# Patient Record
Sex: FEMALE | URBAN - METROPOLITAN AREA
[De-identification: names, ages, dates, MRNs, and addresses within clinical notes are randomized per-mention and may not be internally consistent; named-entity substitution may affect disease eponyms.]

---

## 2020-03-24 ENCOUNTER — INPATIENT (INPATIENT)
Facility: HOSPITAL | Age: 85
LOS: 14 days | Discharge: NURSING, REHAB OR LONG-TERM CARE | DRG: 177 | End: 2020-04-08
Attending: HOSPITALIST | Admitting: HOSPITALIST
Payer: MEDICARE

## 2020-03-24 VITALS
RESPIRATION RATE: 18 BRPM | OXYGEN SATURATION: 97 % | SYSTOLIC BLOOD PRESSURE: 145 MMHG | TEMPERATURE: 99 F | DIASTOLIC BLOOD PRESSURE: 78 MMHG | HEART RATE: 101 BPM | HEIGHT: 62 IN | WEIGHT: 145.06 LBS

## 2020-03-24 LAB
ALBUMIN SERPL ELPH-MCNC: 2.9 G/DL — LOW (ref 3.5–5)
ALP SERPL-CCNC: 90 U/L — SIGNIFICANT CHANGE UP (ref 40–120)
ALT FLD-CCNC: 30 U/L DA — SIGNIFICANT CHANGE UP (ref 10–60)
ANION GAP SERPL CALC-SCNC: 8 MMOL/L — SIGNIFICANT CHANGE UP (ref 5–17)
AST SERPL-CCNC: 51 U/L — HIGH (ref 10–40)
BASE EXCESS BLDA CALC-SCNC: 1.9 MMOL/L — SIGNIFICANT CHANGE UP (ref -2–2)
BASOPHILS # BLD AUTO: 0.01 K/UL — SIGNIFICANT CHANGE UP (ref 0–0.2)
BASOPHILS NFR BLD AUTO: 0.1 % — SIGNIFICANT CHANGE UP (ref 0–2)
BILIRUB SERPL-MCNC: 0.6 MG/DL — SIGNIFICANT CHANGE UP (ref 0.2–1.2)
BLOOD GAS COMMENTS ARTERIAL: SIGNIFICANT CHANGE UP
BUN SERPL-MCNC: 29 MG/DL — HIGH (ref 7–18)
CALCIUM SERPL-MCNC: 8.9 MG/DL — SIGNIFICANT CHANGE UP (ref 8.4–10.5)
CHLORIDE SERPL-SCNC: 104 MMOL/L — SIGNIFICANT CHANGE UP (ref 96–108)
CO2 SERPL-SCNC: 27 MMOL/L — SIGNIFICANT CHANGE UP (ref 22–31)
CREAT SERPL-MCNC: 1.62 MG/DL — HIGH (ref 0.5–1.3)
EOSINOPHIL # BLD AUTO: 0 K/UL — SIGNIFICANT CHANGE UP (ref 0–0.5)
EOSINOPHIL NFR BLD AUTO: 0 % — SIGNIFICANT CHANGE UP (ref 0–6)
GLUCOSE SERPL-MCNC: 125 MG/DL — HIGH (ref 70–99)
HCO3 BLDA-SCNC: 24 MMOL/L — SIGNIFICANT CHANGE UP (ref 23–27)
HCT VFR BLD CALC: 37.1 % — SIGNIFICANT CHANGE UP (ref 34.5–45)
HGB BLD-MCNC: 11.9 G/DL — SIGNIFICANT CHANGE UP (ref 11.5–15.5)
HOROWITZ INDEX BLDA+IHG-RTO: 100 — SIGNIFICANT CHANGE UP
IMM GRANULOCYTES NFR BLD AUTO: 0.7 % — SIGNIFICANT CHANGE UP (ref 0–1.5)
LACTATE SERPL-SCNC: 2 MMOL/L — SIGNIFICANT CHANGE UP (ref 0.7–2)
LYMPHOCYTES # BLD AUTO: 1.14 K/UL — SIGNIFICANT CHANGE UP (ref 1–3.3)
LYMPHOCYTES # BLD AUTO: 8.5 % — LOW (ref 13–44)
MCHC RBC-ENTMCNC: 31.6 PG — SIGNIFICANT CHANGE UP (ref 27–34)
MCHC RBC-ENTMCNC: 32.1 GM/DL — SIGNIFICANT CHANGE UP (ref 32–36)
MCV RBC AUTO: 98.7 FL — SIGNIFICANT CHANGE UP (ref 80–100)
MONOCYTES # BLD AUTO: 0.45 K/UL — SIGNIFICANT CHANGE UP (ref 0–0.9)
MONOCYTES NFR BLD AUTO: 3.4 % — SIGNIFICANT CHANGE UP (ref 2–14)
NEUTROPHILS # BLD AUTO: 11.67 K/UL — HIGH (ref 1.8–7.4)
NEUTROPHILS NFR BLD AUTO: 87.3 % — HIGH (ref 43–77)
NRBC # BLD: 0 /100 WBCS — SIGNIFICANT CHANGE UP (ref 0–0)
NT-PROBNP SERPL-SCNC: 5759 PG/ML — HIGH (ref 0–450)
PCO2 BLDA: 32 MMHG — SIGNIFICANT CHANGE UP (ref 32–46)
PH BLDA: 7.49 — HIGH (ref 7.35–7.45)
PLATELET # BLD AUTO: 169 K/UL — SIGNIFICANT CHANGE UP (ref 150–400)
PO2 BLDA: 68 MMHG — LOW (ref 74–108)
POTASSIUM SERPL-MCNC: 3.9 MMOL/L — SIGNIFICANT CHANGE UP (ref 3.5–5.3)
POTASSIUM SERPL-SCNC: 3.9 MMOL/L — SIGNIFICANT CHANGE UP (ref 3.5–5.3)
PROT SERPL-MCNC: 8.7 G/DL — HIGH (ref 6–8.3)
RBC # BLD: 3.76 M/UL — LOW (ref 3.8–5.2)
RBC # FLD: 12.7 % — SIGNIFICANT CHANGE UP (ref 10.3–14.5)
SAO2 % BLDA: 94 % — SIGNIFICANT CHANGE UP (ref 92–96)
SODIUM SERPL-SCNC: 139 MMOL/L — SIGNIFICANT CHANGE UP (ref 135–145)
TROPONIN I SERPL-MCNC: 0.04 NG/ML — SIGNIFICANT CHANGE UP (ref 0–0.04)
WBC # BLD: 13.36 K/UL — HIGH (ref 3.8–10.5)
WBC # FLD AUTO: 13.36 K/UL — HIGH (ref 3.8–10.5)

## 2020-03-24 PROCEDURE — 71045 X-RAY EXAM CHEST 1 VIEW: CPT | Mod: 26

## 2020-03-24 RX ORDER — PIPERACILLIN AND TAZOBACTAM 4; .5 G/20ML; G/20ML
3.38 INJECTION, POWDER, LYOPHILIZED, FOR SOLUTION INTRAVENOUS ONCE
Refills: 0 | Status: COMPLETED | OUTPATIENT
Start: 2020-03-24 | End: 2020-03-24

## 2020-03-24 RX ADMIN — PIPERACILLIN AND TAZOBACTAM 200 GRAM(S): 4; .5 INJECTION, POWDER, LYOPHILIZED, FOR SOLUTION INTRAVENOUS at 19:17

## 2020-03-24 NOTE — ED ADULT NURSE NOTE - NSIMPLEMENTINTERV_GEN_ALL_ED
Implemented All Fall Risk Interventions:  [place] to call system. Call bell, personal items and telephone within reach. Instruct patient to call for assistance. Room bathroom lighting operational. Non-slip footwear when patient is off stretcher. Physically safe environment: no spills, clutter or unnecessary equipment. Stretcher in lowest position, wheels locked, appropriate side rails in place. Provide visual cue, wrist band, yellow gown, etc. Monitor gait and stability. Monitor for mental status changes and reorient to person, place, and time. Review medications for side effects contributing to fall risk. Reinforce activity limits and safety measures with patient and family.

## 2020-03-25 DIAGNOSIS — E11.9 TYPE 2 DIABETES MELLITUS WITHOUT COMPLICATIONS: ICD-10-CM

## 2020-03-25 DIAGNOSIS — J96.01 ACUTE RESPIRATORY FAILURE WITH HYPOXIA: ICD-10-CM

## 2020-03-25 DIAGNOSIS — Z29.9 ENCOUNTER FOR PROPHYLACTIC MEASURES, UNSPECIFIED: ICD-10-CM

## 2020-03-25 DIAGNOSIS — J18.9 PNEUMONIA, UNSPECIFIED ORGANISM: ICD-10-CM

## 2020-03-25 DIAGNOSIS — F03.90 UNSPECIFIED DEMENTIA WITHOUT BEHAVIORAL DISTURBANCE: ICD-10-CM

## 2020-03-25 DIAGNOSIS — I10 ESSENTIAL (PRIMARY) HYPERTENSION: ICD-10-CM

## 2020-03-25 DIAGNOSIS — N17.9 ACUTE KIDNEY FAILURE, UNSPECIFIED: ICD-10-CM

## 2020-03-25 LAB
ALBUMIN SERPL ELPH-MCNC: 2.3 G/DL — LOW (ref 3.5–5)
ALP SERPL-CCNC: 72 U/L — SIGNIFICANT CHANGE UP (ref 40–120)
ALT FLD-CCNC: 27 U/L DA — SIGNIFICANT CHANGE UP (ref 10–60)
ANION GAP SERPL CALC-SCNC: 6 MMOL/L — SIGNIFICANT CHANGE UP (ref 5–17)
AST SERPL-CCNC: 50 U/L — HIGH (ref 10–40)
BASOPHILS # BLD AUTO: 0.01 K/UL — SIGNIFICANT CHANGE UP (ref 0–0.2)
BASOPHILS NFR BLD AUTO: 0.1 % — SIGNIFICANT CHANGE UP (ref 0–2)
BILIRUB SERPL-MCNC: 0.5 MG/DL — SIGNIFICANT CHANGE UP (ref 0.2–1.2)
BUN SERPL-MCNC: 30 MG/DL — HIGH (ref 7–18)
CALCIUM SERPL-MCNC: 8.3 MG/DL — LOW (ref 8.4–10.5)
CHLORIDE SERPL-SCNC: 105 MMOL/L — SIGNIFICANT CHANGE UP (ref 96–108)
CHOLEST SERPL-MCNC: 92 MG/DL — SIGNIFICANT CHANGE UP (ref 10–199)
CO2 SERPL-SCNC: 27 MMOL/L — SIGNIFICANT CHANGE UP (ref 22–31)
CREAT SERPL-MCNC: 1.51 MG/DL — HIGH (ref 0.5–1.3)
EOSINOPHIL # BLD AUTO: 0 K/UL — SIGNIFICANT CHANGE UP (ref 0–0.5)
EOSINOPHIL NFR BLD AUTO: 0 % — SIGNIFICANT CHANGE UP (ref 0–6)
FLU A RESULT: SIGNIFICANT CHANGE UP
FLU A RESULT: SIGNIFICANT CHANGE UP
FLUAV AG NPH QL: SIGNIFICANT CHANGE UP
FLUBV AG NPH QL: SIGNIFICANT CHANGE UP
FOLATE SERPL-MCNC: >20 NG/ML — SIGNIFICANT CHANGE UP
GLUCOSE SERPL-MCNC: 112 MG/DL — HIGH (ref 70–99)
HBA1C BLD-MCNC: 6.6 % — HIGH (ref 4–5.6)
HCT VFR BLD CALC: 32.2 % — LOW (ref 34.5–45)
HDLC SERPL-MCNC: 39 MG/DL — LOW
HGB BLD-MCNC: 10.5 G/DL — LOW (ref 11.5–15.5)
IMM GRANULOCYTES NFR BLD AUTO: 0.6 % — SIGNIFICANT CHANGE UP (ref 0–1.5)
LIPID PNL WITH DIRECT LDL SERPL: 39 MG/DL — SIGNIFICANT CHANGE UP
LYMPHOCYTES # BLD AUTO: 0.74 K/UL — LOW (ref 1–3.3)
LYMPHOCYTES # BLD AUTO: 7.8 % — LOW (ref 13–44)
MAGNESIUM SERPL-MCNC: 2.2 MG/DL — SIGNIFICANT CHANGE UP (ref 1.6–2.6)
MCHC RBC-ENTMCNC: 31.8 PG — SIGNIFICANT CHANGE UP (ref 27–34)
MCHC RBC-ENTMCNC: 32.6 GM/DL — SIGNIFICANT CHANGE UP (ref 32–36)
MCV RBC AUTO: 97.6 FL — SIGNIFICANT CHANGE UP (ref 80–100)
MONOCYTES # BLD AUTO: 0.34 K/UL — SIGNIFICANT CHANGE UP (ref 0–0.9)
MONOCYTES NFR BLD AUTO: 3.6 % — SIGNIFICANT CHANGE UP (ref 2–14)
NEUTROPHILS # BLD AUTO: 8.38 K/UL — HIGH (ref 1.8–7.4)
NEUTROPHILS NFR BLD AUTO: 87.9 % — HIGH (ref 43–77)
NRBC # BLD: 0 /100 WBCS — SIGNIFICANT CHANGE UP (ref 0–0)
PHOSPHATE SERPL-MCNC: 2.3 MG/DL — LOW (ref 2.5–4.5)
PLATELET # BLD AUTO: 147 K/UL — LOW (ref 150–400)
POTASSIUM SERPL-MCNC: 3.7 MMOL/L — SIGNIFICANT CHANGE UP (ref 3.5–5.3)
POTASSIUM SERPL-SCNC: 3.7 MMOL/L — SIGNIFICANT CHANGE UP (ref 3.5–5.3)
PROT SERPL-MCNC: 7.3 G/DL — SIGNIFICANT CHANGE UP (ref 6–8.3)
RBC # BLD: 3.3 M/UL — LOW (ref 3.8–5.2)
RBC # FLD: 12.5 % — SIGNIFICANT CHANGE UP (ref 10.3–14.5)
RSV RESULT: SIGNIFICANT CHANGE UP
RSV RNA RESP QL NAA+PROBE: SIGNIFICANT CHANGE UP
SARS-COV-2 RNA SPEC QL NAA+PROBE: DETECTED
SODIUM SERPL-SCNC: 138 MMOL/L — SIGNIFICANT CHANGE UP (ref 135–145)
TOTAL CHOLESTEROL/HDL RATIO MEASUREMENT: 2.4 RATIO — LOW (ref 3.3–7.1)
TRIGL SERPL-MCNC: 69 MG/DL — SIGNIFICANT CHANGE UP (ref 10–149)
TSH SERPL-MCNC: 0.66 UU/ML — SIGNIFICANT CHANGE UP (ref 0.34–4.82)
VIT B12 SERPL-MCNC: 1196 PG/ML — SIGNIFICANT CHANGE UP (ref 232–1245)
WBC # BLD: 9.53 K/UL — SIGNIFICANT CHANGE UP (ref 3.8–10.5)
WBC # FLD AUTO: 9.53 K/UL — SIGNIFICANT CHANGE UP (ref 3.8–10.5)

## 2020-03-25 PROCEDURE — 99285 EMERGENCY DEPT VISIT HI MDM: CPT | Mod: CS

## 2020-03-25 PROCEDURE — 99223 1ST HOSP IP/OBS HIGH 75: CPT

## 2020-03-25 RX ORDER — SODIUM CHLORIDE 9 MG/ML
1000 INJECTION INTRAMUSCULAR; INTRAVENOUS; SUBCUTANEOUS
Refills: 0 | Status: DISCONTINUED | OUTPATIENT
Start: 2020-03-25 | End: 2020-03-31

## 2020-03-25 RX ORDER — PANTOPRAZOLE SODIUM 20 MG/1
40 TABLET, DELAYED RELEASE ORAL
Refills: 0 | Status: DISCONTINUED | OUTPATIENT
Start: 2020-03-25 | End: 2020-04-08

## 2020-03-25 RX ORDER — SODIUM,POTASSIUM PHOSPHATES 278-250MG
1 POWDER IN PACKET (EA) ORAL ONCE
Refills: 0 | Status: COMPLETED | OUTPATIENT
Start: 2020-03-25 | End: 2020-03-25

## 2020-03-25 RX ORDER — MEMANTINE HYDROCHLORIDE 10 MG/1
1 TABLET ORAL
Qty: 0 | Refills: 0 | DISCHARGE

## 2020-03-25 RX ORDER — AMLODIPINE BESYLATE 2.5 MG/1
10 TABLET ORAL DAILY
Refills: 0 | Status: DISCONTINUED | OUTPATIENT
Start: 2020-03-25 | End: 2020-04-08

## 2020-03-25 RX ORDER — ENOXAPARIN SODIUM 100 MG/ML
40 INJECTION SUBCUTANEOUS DAILY
Refills: 0 | Status: DISCONTINUED | OUTPATIENT
Start: 2020-03-25 | End: 2020-04-08

## 2020-03-25 RX ORDER — ACETAMINOPHEN 500 MG
650 TABLET ORAL EVERY 6 HOURS
Refills: 0 | Status: DISCONTINUED | OUTPATIENT
Start: 2020-03-25 | End: 2020-04-08

## 2020-03-25 RX ORDER — FUROSEMIDE 40 MG
20 TABLET ORAL DAILY
Refills: 0 | Status: DISCONTINUED | OUTPATIENT
Start: 2020-03-25 | End: 2020-03-25

## 2020-03-25 RX ORDER — MEMANTINE HYDROCHLORIDE 10 MG/1
10 TABLET ORAL
Refills: 0 | Status: DISCONTINUED | OUTPATIENT
Start: 2020-03-25 | End: 2020-04-08

## 2020-03-25 RX ORDER — HYDRALAZINE HCL 50 MG
1 TABLET ORAL
Qty: 0 | Refills: 0 | DISCHARGE

## 2020-03-25 RX ORDER — HYDRALAZINE HCL 50 MG
25 TABLET ORAL EVERY 8 HOURS
Refills: 0 | Status: DISCONTINUED | OUTPATIENT
Start: 2020-03-25 | End: 2020-04-08

## 2020-03-25 RX ORDER — PANTOPRAZOLE SODIUM 20 MG/1
1 TABLET, DELAYED RELEASE ORAL
Qty: 0 | Refills: 0 | DISCHARGE

## 2020-03-25 RX ORDER — AMLODIPINE BESYLATE 2.5 MG/1
1 TABLET ORAL
Qty: 0 | Refills: 0 | DISCHARGE

## 2020-03-25 RX ORDER — TOLTERODINE TARTRATE 1 MG/1
1 TABLET, FILM COATED ORAL
Qty: 0 | Refills: 0 | DISCHARGE

## 2020-03-25 RX ORDER — LOSARTAN POTASSIUM 100 MG/1
100 TABLET, FILM COATED ORAL DAILY
Refills: 0 | Status: DISCONTINUED | OUTPATIENT
Start: 2020-03-25 | End: 2020-03-31

## 2020-03-25 RX ORDER — METOPROLOL TARTRATE 50 MG
100 TABLET ORAL DAILY
Refills: 0 | Status: DISCONTINUED | OUTPATIENT
Start: 2020-03-25 | End: 2020-04-08

## 2020-03-25 RX ORDER — LOSARTAN POTASSIUM 100 MG/1
1 TABLET, FILM COATED ORAL
Qty: 0 | Refills: 0 | DISCHARGE

## 2020-03-25 RX ORDER — OXYBUTYNIN CHLORIDE 5 MG
10 TABLET ORAL
Refills: 0 | Status: DISCONTINUED | OUTPATIENT
Start: 2020-03-25 | End: 2020-04-08

## 2020-03-25 RX ORDER — SITAGLIPTIN 50 MG/1
1 TABLET, FILM COATED ORAL
Qty: 0 | Refills: 0 | DISCHARGE

## 2020-03-25 RX ORDER — METOPROLOL TARTRATE 50 MG
1 TABLET ORAL
Qty: 0 | Refills: 0 | DISCHARGE

## 2020-03-25 RX ORDER — ATORVASTATIN CALCIUM 80 MG/1
40 TABLET, FILM COATED ORAL AT BEDTIME
Refills: 0 | Status: DISCONTINUED | OUTPATIENT
Start: 2020-03-25 | End: 2020-04-08

## 2020-03-25 RX ADMIN — Medication 25 MILLIGRAM(S): at 22:57

## 2020-03-25 RX ADMIN — Medication 10 MILLIGRAM(S): at 06:00

## 2020-03-25 RX ADMIN — MEMANTINE HYDROCHLORIDE 10 MILLIGRAM(S): 10 TABLET ORAL at 06:00

## 2020-03-25 RX ADMIN — AMLODIPINE BESYLATE 10 MILLIGRAM(S): 2.5 TABLET ORAL at 06:09

## 2020-03-25 RX ADMIN — MEMANTINE HYDROCHLORIDE 10 MILLIGRAM(S): 10 TABLET ORAL at 17:56

## 2020-03-25 RX ADMIN — ATORVASTATIN CALCIUM 40 MILLIGRAM(S): 80 TABLET, FILM COATED ORAL at 22:56

## 2020-03-25 RX ADMIN — Medication 25 MILLIGRAM(S): at 13:08

## 2020-03-25 RX ADMIN — Medication 25 MILLIGRAM(S): at 06:08

## 2020-03-25 RX ADMIN — PANTOPRAZOLE SODIUM 40 MILLIGRAM(S): 20 TABLET, DELAYED RELEASE ORAL at 06:00

## 2020-03-25 RX ADMIN — Medication 100 MILLIGRAM(S): at 06:00

## 2020-03-25 RX ADMIN — SODIUM CHLORIDE 60 MILLILITER(S): 9 INJECTION INTRAMUSCULAR; INTRAVENOUS; SUBCUTANEOUS at 04:35

## 2020-03-25 RX ADMIN — SODIUM CHLORIDE 60 MILLILITER(S): 9 INJECTION INTRAMUSCULAR; INTRAVENOUS; SUBCUTANEOUS at 06:01

## 2020-03-25 RX ADMIN — Medication 10 MILLIGRAM(S): at 17:56

## 2020-03-25 RX ADMIN — ENOXAPARIN SODIUM 40 MILLIGRAM(S): 100 INJECTION SUBCUTANEOUS at 12:11

## 2020-03-25 RX ADMIN — LOSARTAN POTASSIUM 100 MILLIGRAM(S): 100 TABLET, FILM COATED ORAL at 06:09

## 2020-03-25 RX ADMIN — Medication 1 PACKET(S): at 12:11

## 2020-03-25 NOTE — H&P ADULT - HISTORY OF PRESENT ILLNESS
89 y/o female from [facility] with PMHx of HTN, DM, dementia (AAOx1-2) and HLD is sent to the ED for shortness of breath. Patient is a poor historian and denies any complaints. She denies cough or fever and reports feeling well at present. ABG confirms hypoxemia with pO2 68 on admission. Patient currently saturating at 97% on 4L NC. She is in no acute distress.    GOC: As per AL documents, patient is DNR. Will need to readdress this with family in AM to confirm if patient is both DNR/DNI.

## 2020-03-25 NOTE — H&P ADULT - NSICDXPASTMEDICALHX_GEN_ALL_CORE_FT
PAST MEDICAL HISTORY:  Colon cancer     CVA (cerebral vascular accident)     Dementia     DM (diabetes mellitus)     HTN (hypertension)

## 2020-03-25 NOTE — ED PROVIDER NOTE - MUSCULOSKELETAL, MLM
Spine appears normal, range of motion is not limited, no muscle or joint tenderness, RLE- sm ant ulcer, no surrounding erythema

## 2020-03-25 NOTE — H&P ADULT - PROBLEM SELECTOR PLAN 1
hypoxic on room air to low 90s, improved to 97% on 4L NC  CXR shows bilateral infiltrates  afebrile in ED  likely 2/2 to novel coronavirus  starting zithromax and rocephin for CAP coverage  f/u flu panel, RVP and COVID-19 PCR  f/u blood cx

## 2020-03-25 NOTE — GOALS OF CARE CONVERSATION - ADVANCED CARE PLANNING - CONVERSATION DETAILS
PC NP spoke with the patient and subsequently contacted the patient's son Amor Brasher.  Patient resides in [facility].  Goals of care were addressed.  Code status is DNR/DNI as patient's son stated he would not want any interventions that would prolong suffering.

## 2020-03-25 NOTE — H&P ADULT - ATTENDING COMMENTS
Pt seen and examined  Case discussed with housestaff.  Agree with hpi /ros/past hx/ med review as above    89 year old Nursing Home resident with med hx including HTN/DM 2/ dementia  who presents with persistent SOB per NH report. Pt unable to corroborate hx likely due to her cognitive deficits.  She is hypoxic requiring O2 supplementation. Afebrile in ED.    Vital Signs Last 24 Hrs  T(C): 37.5 (25 Mar 2020 05:03), Max: 37.5 (25 Mar 2020 05:03)  T(F): 99.5 (25 Mar 2020 05:03), Max: 99.5 (25 Mar 2020 05:03)  HR: 81 (25 Mar 2020 05:03) (81 - 101)  BP: 144/73 (25 Mar 2020 05:03) (144/73 - 160/60)  RR: 19 (25 Mar 2020 05:03) (18 - 19)  SpO2: 94% (25 Mar 2020 05:03) (94% - 97%)    Elderly woman, dyspneic with mild exertion on bed, alert and awake; able to answer simple questions.  CTA B/L RRR S1S2 only  Soft Nt ND BS +  No pedal edema  moving extremities spontaneously    labs                        11.9   13.36 )-----------( 169      ( 24 Mar 2020 19:19 )             37.1     03-24    139  |  104  |  29<H>  ----------------------------<  125<H>  3.9   |  27  |  1.62<H>    Ca    8.9      24 Mar 2020 19:19    TPro  8.7<H>  /  Alb  2.9<L>  /  TBili  0.6  /  DBili  x   /  AST  51<H>  /  ALT  30  /  AlkPhos  90  03-24    pro bnp - [id number]    ABG - ( 24 Mar 2020 19:12 )  pH, Arterial: 7.49  pH, Blood: x     /  pCO2: 32    /  pO2: 68    / HCO3: 24    / Base Excess: 1.9   /  SaO2: 94        Flu -ve    CXR -There is moderately extensive mixed interstitial and airspace opacity in the left lung with mild change on the right consistent with pneumonia or pneumonitis    Impression  - Acute respiratory failure with sever hypoxia  - Suspected COVID -19 with severe B/L pneumonia  - MARIA E vs CKD; no old labs to compare  - dementia  - DM2   - HTN  - elevated pro- bnp r/o LV dysfunction    A/P  Admit to medicine with isolation protocol  f/up tests pending; repeat trop  Empiric antibiotics - ceftriaxone and zithro  Ensure supplemental O2  Supportive care  IV hydration and repeat chem  If no improvement in renal indices - work up for CKD  ECHO  Glucose and BP control  Close monitoring  Pt is DNR

## 2020-03-25 NOTE — H&P ADULT - NSHPPHYSICALEXAM_GEN_ALL_CORE
Vital Signs Last 24 Hrs  T(C): 36.9 (25 Mar 2020 00:26), Max: 37.3 (24 Mar 2020 18:00)  T(F): 98.4 (25 Mar 2020 00:26), Max: 99.2 (24 Mar 2020 18:00)  HR: 85 (25 Mar 2020 00:26) (85 - 101)  BP: 160/60 (25 Mar 2020 00:26) (145/78 - 160/60)  BP(mean): --  RR: 18 (25 Mar 2020 00:26) (18 - 18)  SpO2: 94% (25 Mar 2020 00:26) (94% - 97%)

## 2020-03-25 NOTE — H&P ADULT - ASSESSMENT
89 y/o female admitted to medicine for hypoxic respiratory failure 2/2 to bilateral pneumonia, likely 2/2 to COVID-19.

## 2020-03-25 NOTE — CHART NOTE - NSCHARTNOTEFT_GEN_A_CORE
Palliative care;    Spoke with the pt's son via phone ([phone]) his wife Fadumo was also on the call, discussed goals of care.  SYLWIA drafted per family's wishes; DNR/DNI.

## 2020-03-25 NOTE — H&P ADULT - PROBLEM SELECTOR PLAN 7
IMPROVE VTE Individual Risk Assessment          RISK                                                          Points  [  ] Previous VTE                                                3  [  ] Thrombophilia                                             2  [  ] Lower limb paralysis                                   2        (unable to hold up >15 seconds)    [  ] Current Cancer                                             2         (within 6 months)  [ X ] Immobilization > 24 hrs                              1  [  ] ICU/CCU stay > 24 hours                             1  [X  ] Age > 60                                                         1    IMPROVE VTE Score: 2    lovenox subq for dvt ppx

## 2020-03-25 NOTE — ED PROVIDER NOTE - OBJECTIVE STATEMENT
89 y.o. tl AL resident BIBA reportedly with hypoxia, diff. breathing.  Pt's unable to provide any info. pt claims she feel good.  Note pt cough up thick yellowish tinged with blood sputum

## 2020-03-25 NOTE — H&P ADULT - PROBLEM SELECTOR PLAN 3
creatinine 1.6 on admission - baseline unknown  f/u UA and urine lytes  avoid nephrotoxins  gentle hydration  hold home med lasix  bmp daily

## 2020-03-26 LAB
ALBUMIN SERPL ELPH-MCNC: 2.2 G/DL — LOW (ref 3.5–5)
ALP SERPL-CCNC: 75 U/L — SIGNIFICANT CHANGE UP (ref 40–120)
ALT FLD-CCNC: 31 U/L DA — SIGNIFICANT CHANGE UP (ref 10–60)
ANION GAP SERPL CALC-SCNC: 12 MMOL/L — SIGNIFICANT CHANGE UP (ref 5–17)
AST SERPL-CCNC: 77 U/L — HIGH (ref 10–40)
BILIRUB SERPL-MCNC: 0.6 MG/DL — SIGNIFICANT CHANGE UP (ref 0.2–1.2)
BUN SERPL-MCNC: 29 MG/DL — HIGH (ref 7–18)
CALCIUM SERPL-MCNC: 8.6 MG/DL — SIGNIFICANT CHANGE UP (ref 8.4–10.5)
CHLORIDE SERPL-SCNC: 105 MMOL/L — SIGNIFICANT CHANGE UP (ref 96–108)
CO2 SERPL-SCNC: 24 MMOL/L — SIGNIFICANT CHANGE UP (ref 22–31)
CREAT SERPL-MCNC: 1.52 MG/DL — HIGH (ref 0.5–1.3)
GLUCOSE BLDC GLUCOMTR-MCNC: 108 MG/DL — HIGH (ref 70–99)
GLUCOSE BLDC GLUCOMTR-MCNC: 69 MG/DL — LOW (ref 70–99)
GLUCOSE BLDC GLUCOMTR-MCNC: 84 MG/DL — SIGNIFICANT CHANGE UP (ref 70–99)
GLUCOSE BLDC GLUCOMTR-MCNC: 85 MG/DL — SIGNIFICANT CHANGE UP (ref 70–99)
GLUCOSE SERPL-MCNC: 83 MG/DL — SIGNIFICANT CHANGE UP (ref 70–99)
HCT VFR BLD CALC: 34 % — LOW (ref 34.5–45)
HGB BLD-MCNC: 10.8 G/DL — LOW (ref 11.5–15.5)
MAGNESIUM SERPL-MCNC: 2.4 MG/DL — SIGNIFICANT CHANGE UP (ref 1.6–2.6)
MCHC RBC-ENTMCNC: 31.4 PG — SIGNIFICANT CHANGE UP (ref 27–34)
MCHC RBC-ENTMCNC: 31.8 GM/DL — LOW (ref 32–36)
MCV RBC AUTO: 98.8 FL — SIGNIFICANT CHANGE UP (ref 80–100)
NRBC # BLD: 0 /100 WBCS — SIGNIFICANT CHANGE UP (ref 0–0)
PHOSPHATE SERPL-MCNC: 3.7 MG/DL — SIGNIFICANT CHANGE UP (ref 2.5–4.5)
PLATELET # BLD AUTO: 184 K/UL — SIGNIFICANT CHANGE UP (ref 150–400)
POTASSIUM SERPL-MCNC: 3.6 MMOL/L — SIGNIFICANT CHANGE UP (ref 3.5–5.3)
POTASSIUM SERPL-SCNC: 3.6 MMOL/L — SIGNIFICANT CHANGE UP (ref 3.5–5.3)
PROT SERPL-MCNC: 7.4 G/DL — SIGNIFICANT CHANGE UP (ref 6–8.3)
RBC # BLD: 3.44 M/UL — LOW (ref 3.8–5.2)
RBC # FLD: 13 % — SIGNIFICANT CHANGE UP (ref 10.3–14.5)
SODIUM SERPL-SCNC: 141 MMOL/L — SIGNIFICANT CHANGE UP (ref 135–145)
WBC # BLD: 13.34 K/UL — HIGH (ref 3.8–10.5)
WBC # FLD AUTO: 13.34 K/UL — HIGH (ref 3.8–10.5)

## 2020-03-26 PROCEDURE — 99233 SBSQ HOSP IP/OBS HIGH 50: CPT | Mod: GC

## 2020-03-26 RX ORDER — INSULIN LISPRO 100/ML
VIAL (ML) SUBCUTANEOUS AT BEDTIME
Refills: 0 | Status: DISCONTINUED | OUTPATIENT
Start: 2020-03-26 | End: 2020-04-08

## 2020-03-26 RX ORDER — DEXTROSE 50 % IN WATER 50 %
15 SYRINGE (ML) INTRAVENOUS ONCE
Refills: 0 | Status: DISCONTINUED | OUTPATIENT
Start: 2020-03-26 | End: 2020-04-08

## 2020-03-26 RX ORDER — INSULIN LISPRO 100/ML
VIAL (ML) SUBCUTANEOUS
Refills: 0 | Status: DISCONTINUED | OUTPATIENT
Start: 2020-03-26 | End: 2020-04-08

## 2020-03-26 RX ORDER — AZITHROMYCIN 500 MG/1
500 TABLET, FILM COATED ORAL DAILY
Refills: 0 | Status: DISCONTINUED | OUTPATIENT
Start: 2020-03-26 | End: 2020-03-31

## 2020-03-26 RX ORDER — HYDROXYCHLOROQUINE SULFATE 200 MG
200 TABLET ORAL EVERY 12 HOURS
Refills: 0 | Status: COMPLETED | OUTPATIENT
Start: 2020-03-27 | End: 2020-03-30

## 2020-03-26 RX ORDER — HYDROXYCHLOROQUINE SULFATE 200 MG
400 TABLET ORAL EVERY 12 HOURS
Refills: 0 | Status: COMPLETED | OUTPATIENT
Start: 2020-03-26 | End: 2020-03-26

## 2020-03-26 RX ORDER — CEFTRIAXONE 500 MG/1
1000 INJECTION, POWDER, FOR SOLUTION INTRAMUSCULAR; INTRAVENOUS ONCE
Refills: 0 | Status: COMPLETED | OUTPATIENT
Start: 2020-03-26 | End: 2020-03-26

## 2020-03-26 RX ORDER — HYDROXYCHLOROQUINE SULFATE 200 MG
TABLET ORAL
Refills: 0 | Status: COMPLETED | OUTPATIENT
Start: 2020-03-26 | End: 2020-03-30

## 2020-03-26 RX ORDER — CEFTRIAXONE 500 MG/1
INJECTION, POWDER, FOR SOLUTION INTRAMUSCULAR; INTRAVENOUS
Refills: 0 | Status: DISCONTINUED | OUTPATIENT
Start: 2020-03-26 | End: 2020-03-31

## 2020-03-26 RX ORDER — CEFTRIAXONE 500 MG/1
1000 INJECTION, POWDER, FOR SOLUTION INTRAMUSCULAR; INTRAVENOUS EVERY 24 HOURS
Refills: 0 | Status: DISCONTINUED | OUTPATIENT
Start: 2020-03-27 | End: 2020-03-31

## 2020-03-26 RX ADMIN — Medication 650 MILLIGRAM(S): at 03:41

## 2020-03-26 RX ADMIN — Medication 400 MILLIGRAM(S): at 14:35

## 2020-03-26 RX ADMIN — Medication 10 MILLIGRAM(S): at 18:21

## 2020-03-26 RX ADMIN — AMLODIPINE BESYLATE 10 MILLIGRAM(S): 2.5 TABLET ORAL at 06:09

## 2020-03-26 RX ADMIN — Medication 400 MILLIGRAM(S): at 18:21

## 2020-03-26 RX ADMIN — MEMANTINE HYDROCHLORIDE 10 MILLIGRAM(S): 10 TABLET ORAL at 18:21

## 2020-03-26 RX ADMIN — PANTOPRAZOLE SODIUM 40 MILLIGRAM(S): 20 TABLET, DELAYED RELEASE ORAL at 06:10

## 2020-03-26 RX ADMIN — Medication 25 MILLIGRAM(S): at 06:10

## 2020-03-26 RX ADMIN — Medication 10 MILLIGRAM(S): at 06:09

## 2020-03-26 RX ADMIN — LOSARTAN POTASSIUM 100 MILLIGRAM(S): 100 TABLET, FILM COATED ORAL at 06:09

## 2020-03-26 RX ADMIN — MEMANTINE HYDROCHLORIDE 10 MILLIGRAM(S): 10 TABLET ORAL at 06:09

## 2020-03-26 RX ADMIN — ATORVASTATIN CALCIUM 40 MILLIGRAM(S): 80 TABLET, FILM COATED ORAL at 22:46

## 2020-03-26 RX ADMIN — Medication 650 MILLIGRAM(S): at 01:55

## 2020-03-26 RX ADMIN — AZITHROMYCIN 500 MILLIGRAM(S): 500 TABLET, FILM COATED ORAL at 18:22

## 2020-03-26 RX ADMIN — ENOXAPARIN SODIUM 40 MILLIGRAM(S): 100 INJECTION SUBCUTANEOUS at 14:36

## 2020-03-26 RX ADMIN — Medication 100 MILLIGRAM(S): at 06:10

## 2020-03-26 RX ADMIN — Medication 25 MILLIGRAM(S): at 22:46

## 2020-03-26 RX ADMIN — CEFTRIAXONE 100 MILLIGRAM(S): 500 INJECTION, POWDER, FOR SOLUTION INTRAMUSCULAR; INTRAVENOUS at 14:00

## 2020-03-26 NOTE — PROGRESS NOTE ADULT - SUBJECTIVE AND OBJECTIVE BOX
PGY 1 Note discussed with supervising resident and primary attending    Patient is a 89y old  Female who presents with a chief complaint of acute hypoxic respiratory failure (25 Mar 2020 02:50)      INTERVAL HPI/OVERNIGHT EVENTS:  Patient was seen and examined at bedside.  was saturating 95 % on non rebreather.  Tmax was 100.2.    MEDICATIONS  (STANDING):  amLODIPine   Tablet 10 milliGRAM(s) Oral daily  atorvastatin 40 milliGRAM(s) Oral at bedtime  enoxaparin Injectable 40 milliGRAM(s) SubCutaneous daily  hydrALAZINE 25 milliGRAM(s) Oral every 8 hours  insulin lispro (HumaLOG) corrective regimen sliding scale   SubCutaneous three times a day before meals  insulin lispro (HumaLOG) corrective regimen sliding scale   SubCutaneous at bedtime  losartan 100 milliGRAM(s) Oral daily  memantine 10 milliGRAM(s) Oral two times a day  metoprolol succinate  milliGRAM(s) Oral daily  oxybutynin 10 milliGRAM(s) Oral two times a day  pantoprazole    Tablet 40 milliGRAM(s) Oral before breakfast  sodium chloride 0.9%. 1000 milliLiter(s) (60 mL/Hr) IV Continuous <Continuous>    MEDICATIONS  (PRN):  acetaminophen   Tablet .. 650 milliGRAM(s) Oral every 6 hours PRN Temp greater or equal to 38C (100.4F)  dextrose 40% Gel 15 Gram(s) Oral once PRN Blood Glucose LESS THAN 70 milliGRAM(s)/deciliter  guaiFENesin   Syrup  (Sugar-Free) 200 milliGRAM(s) Oral every 6 hours PRN Cough      __________________________________________________  REVIEW OF SYSTEMS:    CONSTITUTIONAL: No fever,   EYES: no acute visual disturbances  NECK: No pain or stiffness  RESPIRATORY: No cough; No shortness of breath  CARDIOVASCULAR: No chest pain, no palpitations  GASTROINTESTINAL: No pain. No nausea or vomiting; No diarrhea   NEUROLOGICAL: No headache or numbness, no tremors  MUSCULOSKELETAL: No joint pain, no muscle pain  GENITOURINARY: no dysuria, no frequency, no hesitancy  PSYCHIATRY: no depression , no anxiety  ALL OTHER  ROS negative        Vital Signs Last 24 Hrs  T(C): 37 (26 Mar 2020 10:24), Max: 39.3 (26 Mar 2020 01:44)  T(F): 98.6 (26 Mar 2020 10:24), Max: 102.7 (26 Mar 2020 01:44)  HR: 79 (26 Mar 2020 10:24) (69 - 105)  BP: 117/47 (26 Mar 2020 10:24) (117/47 - 156/70)  BP(mean): --  RR: 17 (26 Mar 2020 10:24) (17 - 19)  SpO2: 95% (26 Mar 2020 10:24) (86% - 96%)    ________________________________________________  PHYSICAL EXAM:  GENERAL: elderly female sitting comfortably.   HEENT: Normocephalic;  conjunctivae and sclerae clear; moist mucous membranes;   NECK : supple  CHEST/LUNG: Clear to auscultation bilaterally with good air entry   HEART: S1 S2  regular; no murmurs, gallops or rubs  ABDOMEN: Soft, Nontender, Nondistended; Bowel sounds present  EXTREMITIES: no cyanosis; no edema; no calf tenderness  SKIN: warm and dry; no rash  NERVOUS SYSTEM:  Awake and alert; Oriented  to place, person and time ; no new deficits    _________________________________________________  LABS:                        10.8   13.34 )-----------( 184      ( 26 Mar 2020 07:09 )             34.0     03-26    141  |  105  |  29<H>  ----------------------------<  83  3.6   |  24  |  1.52<H>    Ca    8.6      26 Mar 2020 07:09  Phos  3.7     03-26  Mg     2.4     03-26    TPro  7.4  /  Alb  2.2<L>  /  TBili  0.6  /  DBili  x   /  AST  77<H>  /  ALT  31  /  AlkPhos  75  03-26        CAPILLARY BLOOD GLUCOSE      POCT Blood Glucose.: 108 mg/dL (26 Mar 2020 08:57)        RADIOLOGY & ADDITIONAL TESTS:    Imaging Personally Reviewed:  YES/NO    Consultant(s) Notes Reviewed:   YES/ No    Care Discussed with Consultants :     Plan of care was discussed with patient and /or primary care giver; all questions and concerns were addressed and care was aligned with patient's wishes.

## 2020-03-26 NOTE — PROGRESS NOTE ADULT - PROBLEM SELECTOR PLAN 1
Patient was placed on non  rebreather because she was desaturating to 80s on nasal canula.  COVID +ve.  will start hydroxychloroquine from today .

## 2020-03-26 NOTE — PROGRESS NOTE ADULT - PROBLEM SELECTOR PLAN 3
creatinine 1.6 on admission - baseline unknown  creatinine today is 1.5    avoid nephrotoxins  gentle hydration  hold home med lasix  bmp daily

## 2020-03-27 LAB
ALBUMIN SERPL ELPH-MCNC: 2.1 G/DL — LOW (ref 3.5–5)
ALP SERPL-CCNC: 69 U/L — SIGNIFICANT CHANGE UP (ref 40–120)
ALT FLD-CCNC: 27 U/L DA — SIGNIFICANT CHANGE UP (ref 10–60)
ANION GAP SERPL CALC-SCNC: 8 MMOL/L — SIGNIFICANT CHANGE UP (ref 5–17)
AST SERPL-CCNC: 71 U/L — HIGH (ref 10–40)
BASOPHILS # BLD AUTO: 0.01 K/UL — SIGNIFICANT CHANGE UP (ref 0–0.2)
BASOPHILS NFR BLD AUTO: 0.1 % — SIGNIFICANT CHANGE UP (ref 0–2)
BILIRUB SERPL-MCNC: 0.5 MG/DL — SIGNIFICANT CHANGE UP (ref 0.2–1.2)
BUN SERPL-MCNC: 35 MG/DL — HIGH (ref 7–18)
CALCIUM SERPL-MCNC: 8.4 MG/DL — SIGNIFICANT CHANGE UP (ref 8.4–10.5)
CHLORIDE SERPL-SCNC: 105 MMOL/L — SIGNIFICANT CHANGE UP (ref 96–108)
CO2 SERPL-SCNC: 27 MMOL/L — SIGNIFICANT CHANGE UP (ref 22–31)
CREAT SERPL-MCNC: 1.54 MG/DL — HIGH (ref 0.5–1.3)
EOSINOPHIL # BLD AUTO: 0 K/UL — SIGNIFICANT CHANGE UP (ref 0–0.5)
EOSINOPHIL NFR BLD AUTO: 0 % — SIGNIFICANT CHANGE UP (ref 0–6)
GLUCOSE BLDC GLUCOMTR-MCNC: 121 MG/DL — HIGH (ref 70–99)
GLUCOSE BLDC GLUCOMTR-MCNC: 122 MG/DL — HIGH (ref 70–99)
GLUCOSE BLDC GLUCOMTR-MCNC: 58 MG/DL — LOW (ref 70–99)
GLUCOSE BLDC GLUCOMTR-MCNC: 84 MG/DL — SIGNIFICANT CHANGE UP (ref 70–99)
GLUCOSE BLDC GLUCOMTR-MCNC: 85 MG/DL — SIGNIFICANT CHANGE UP (ref 70–99)
GLUCOSE BLDC GLUCOMTR-MCNC: 86 MG/DL — SIGNIFICANT CHANGE UP (ref 70–99)
GLUCOSE SERPL-MCNC: 75 MG/DL — SIGNIFICANT CHANGE UP (ref 70–99)
HCT VFR BLD CALC: 32.4 % — LOW (ref 34.5–45)
HGB BLD-MCNC: 10.5 G/DL — LOW (ref 11.5–15.5)
IMM GRANULOCYTES NFR BLD AUTO: 1.6 % — HIGH (ref 0–1.5)
LYMPHOCYTES # BLD AUTO: 0.79 K/UL — LOW (ref 1–3.3)
LYMPHOCYTES # BLD AUTO: 7.2 % — LOW (ref 13–44)
MAGNESIUM SERPL-MCNC: 2.5 MG/DL — SIGNIFICANT CHANGE UP (ref 1.6–2.6)
MCHC RBC-ENTMCNC: 31.6 PG — SIGNIFICANT CHANGE UP (ref 27–34)
MCHC RBC-ENTMCNC: 32.4 GM/DL — SIGNIFICANT CHANGE UP (ref 32–36)
MCV RBC AUTO: 97.6 FL — SIGNIFICANT CHANGE UP (ref 80–100)
MONOCYTES # BLD AUTO: 0.39 K/UL — SIGNIFICANT CHANGE UP (ref 0–0.9)
MONOCYTES NFR BLD AUTO: 3.6 % — SIGNIFICANT CHANGE UP (ref 2–14)
NEUTROPHILS # BLD AUTO: 9.54 K/UL — HIGH (ref 1.8–7.4)
NEUTROPHILS NFR BLD AUTO: 87.5 % — HIGH (ref 43–77)
NRBC # BLD: 0 /100 WBCS — SIGNIFICANT CHANGE UP (ref 0–0)
PHOSPHATE SERPL-MCNC: 2.4 MG/DL — LOW (ref 2.5–4.5)
PLATELET # BLD AUTO: 203 K/UL — SIGNIFICANT CHANGE UP (ref 150–400)
POTASSIUM SERPL-MCNC: 3.4 MMOL/L — LOW (ref 3.5–5.3)
POTASSIUM SERPL-SCNC: 3.4 MMOL/L — LOW (ref 3.5–5.3)
PROT SERPL-MCNC: 7.2 G/DL — SIGNIFICANT CHANGE UP (ref 6–8.3)
RAPID RVP RESULT: SIGNIFICANT CHANGE UP
RBC # BLD: 3.32 M/UL — LOW (ref 3.8–5.2)
RBC # FLD: 13.1 % — SIGNIFICANT CHANGE UP (ref 10.3–14.5)
SODIUM SERPL-SCNC: 140 MMOL/L — SIGNIFICANT CHANGE UP (ref 135–145)
WBC # BLD: 10.9 K/UL — HIGH (ref 3.8–10.5)
WBC # FLD AUTO: 10.9 K/UL — HIGH (ref 3.8–10.5)

## 2020-03-27 PROCEDURE — 99233 SBSQ HOSP IP/OBS HIGH 50: CPT

## 2020-03-27 RX ORDER — POTASSIUM CHLORIDE 20 MEQ
40 PACKET (EA) ORAL ONCE
Refills: 0 | Status: COMPLETED | OUTPATIENT
Start: 2020-03-27 | End: 2020-03-27

## 2020-03-27 RX ORDER — POTASSIUM PHOSPHATE, MONOBASIC POTASSIUM PHOSPHATE, DIBASIC 236; 224 MG/ML; MG/ML
15 INJECTION, SOLUTION INTRAVENOUS ONCE
Refills: 0 | Status: COMPLETED | OUTPATIENT
Start: 2020-03-27 | End: 2020-03-27

## 2020-03-27 RX ADMIN — ATORVASTATIN CALCIUM 40 MILLIGRAM(S): 80 TABLET, FILM COATED ORAL at 22:24

## 2020-03-27 RX ADMIN — MEMANTINE HYDROCHLORIDE 10 MILLIGRAM(S): 10 TABLET ORAL at 06:11

## 2020-03-27 RX ADMIN — Medication 200 MILLIGRAM(S): at 16:56

## 2020-03-27 RX ADMIN — Medication 10 MILLIGRAM(S): at 06:11

## 2020-03-27 RX ADMIN — Medication 200 MILLIGRAM(S): at 06:11

## 2020-03-27 RX ADMIN — AZITHROMYCIN 500 MILLIGRAM(S): 500 TABLET, FILM COATED ORAL at 11:58

## 2020-03-27 RX ADMIN — Medication 10 MILLIGRAM(S): at 16:57

## 2020-03-27 RX ADMIN — POTASSIUM PHOSPHATE, MONOBASIC POTASSIUM PHOSPHATE, DIBASIC 62.5 MILLIMOLE(S): 236; 224 INJECTION, SOLUTION INTRAVENOUS at 17:21

## 2020-03-27 RX ADMIN — CEFTRIAXONE 100 MILLIGRAM(S): 500 INJECTION, POWDER, FOR SOLUTION INTRAMUSCULAR; INTRAVENOUS at 11:59

## 2020-03-27 RX ADMIN — MEMANTINE HYDROCHLORIDE 10 MILLIGRAM(S): 10 TABLET ORAL at 16:57

## 2020-03-27 RX ADMIN — Medication 40 MILLIEQUIVALENT(S): at 17:21

## 2020-03-27 RX ADMIN — ENOXAPARIN SODIUM 40 MILLIGRAM(S): 100 INJECTION SUBCUTANEOUS at 11:59

## 2020-03-27 RX ADMIN — Medication 100 MILLIGRAM(S): at 06:11

## 2020-03-27 RX ADMIN — AMLODIPINE BESYLATE 10 MILLIGRAM(S): 2.5 TABLET ORAL at 06:10

## 2020-03-27 RX ADMIN — Medication 25 MILLIGRAM(S): at 11:59

## 2020-03-27 RX ADMIN — Medication 25 MILLIGRAM(S): at 06:12

## 2020-03-27 RX ADMIN — Medication 25 MILLIGRAM(S): at 22:24

## 2020-03-27 RX ADMIN — PANTOPRAZOLE SODIUM 40 MILLIGRAM(S): 20 TABLET, DELAYED RELEASE ORAL at 06:11

## 2020-03-27 RX ADMIN — LOSARTAN POTASSIUM 100 MILLIGRAM(S): 100 TABLET, FILM COATED ORAL at 06:11

## 2020-03-27 NOTE — PROGRESS NOTE ADULT - ASSESSMENT
88 y/o female admitted to medicine for hypoxic respiratory failure 2/2 to bilateral pneumonia, likely 2/2 to COVID-19.

## 2020-03-27 NOTE — PROGRESS NOTE ADULT - PROBLEM SELECTOR PLAN 3
creatinine 1.6 on admission - baseline unknown  creatinine today is 1.5    avoid nephrotoxins  gentle hydration  hold home med lasix  bmp daily creatinine 1.6 on admission - baseline unknown  creatinine today is 1.54.    avoid nephrotoxins  gentle hydration  hold home med lasix  bmp daily

## 2020-03-27 NOTE — PROGRESS NOTE ADULT - SUBJECTIVE AND OBJECTIVE BOX
PGY 1 Note discussed with supervising resident and primary attending    Patient is a 89y old  Female who presents with a chief complaint of acute hypoxic respiratory failure (26 Mar 2020 11:49)      INTERVAL HPI/OVERNIGHT EVENTS: Patient was seen and examined at bedside.  saturating 92% on Non rebreather.  Tmax was 98.2.        MEDICATIONS  (STANDING):  amLODIPine   Tablet 10 milliGRAM(s) Oral daily  atorvastatin 40 milliGRAM(s) Oral at bedtime  azithromycin   Tablet 500 milliGRAM(s) Oral daily  cefTRIAXone   IVPB      cefTRIAXone   IVPB 1000 milliGRAM(s) IV Intermittent every 24 hours  enoxaparin Injectable 40 milliGRAM(s) SubCutaneous daily  hydrALAZINE 25 milliGRAM(s) Oral every 8 hours  hydroxychloroquine 200 milliGRAM(s) Oral every 12 hours  hydroxychloroquine   Oral   insulin lispro (HumaLOG) corrective regimen sliding scale   SubCutaneous three times a day before meals  insulin lispro (HumaLOG) corrective regimen sliding scale   SubCutaneous at bedtime  losartan 100 milliGRAM(s) Oral daily  memantine 10 milliGRAM(s) Oral two times a day  metoprolol succinate  milliGRAM(s) Oral daily  oxybutynin 10 milliGRAM(s) Oral two times a day  pantoprazole    Tablet 40 milliGRAM(s) Oral before breakfast  sodium chloride 0.9%. 1000 milliLiter(s) (60 mL/Hr) IV Continuous <Continuous>    MEDICATIONS  (PRN):  acetaminophen   Tablet .. 650 milliGRAM(s) Oral every 6 hours PRN Temp greater or equal to 38C (100.4F)  dextrose 40% Gel 15 Gram(s) Oral once PRN Blood Glucose LESS THAN 70 milliGRAM(s)/deciliter  guaiFENesin   Syrup  (Sugar-Free) 200 milliGRAM(s) Oral every 6 hours PRN Cough      __________________________________________________  REVIEW OF SYSTEMS:    CONSTITUTIONAL: No fever,   EYES: no acute visual disturbances  NECK: No pain or stiffness  RESPIRATORY: No cough; No shortness of breath  CARDIOVASCULAR: No chest pain, no palpitations  GASTROINTESTINAL: No pain. No nausea or vomiting; No diarrhea   NEUROLOGICAL: No headache or numbness, no tremors  MUSCULOSKELETAL: No joint pain, no muscle pain  GENITOURINARY: no dysuria, no frequency, no hesitancy  PSYCHIATRY: no depression , no anxiety  ALL OTHER  ROS negative        Vital Signs Last 24 Hrs  T(C): 36.6 (27 Mar 2020 10:05), Max: 37.1 (26 Mar 2020 17:23)  T(F): 97.9 (27 Mar 2020 10:05), Max: 98.7 (26 Mar 2020 17:23)  HR: 78 (27 Mar 2020 10:05) (72 - 89)  BP: 127/75 (27 Mar 2020 10:05) (123/59 - 180/65)  BP(mean): --  RR: 17 (27 Mar 2020 10:05) (17 - 24)  SpO2: 93% (27 Mar 2020 10:05) (93% - 97%)    ________________________________________________  PHYSICAL EXAM:  GENERAL: NAD  HEENT: Normocephalic;  conjunctivae and sclerae clear; moist mucous membranes;   NECK : supple  CHEST/LUNG: Clear to auscultation bilaterally with good air entry   HEART: S1 S2  regular; no murmurs, gallops or rubs  ABDOMEN: Soft, Nontender, Nondistended; Bowel sounds present  EXTREMITIES: no cyanosis; no edema; no calf tenderness  SKIN: warm and dry; no rash  NERVOUS SYSTEM:  Awake and alert; Oriented  to place, person and time ; no new deficits    _________________________________________________  LABS:                        10.5   10.90 )-----------( 203      ( 27 Mar 2020 06:23 )             32.4     03-27    140  |  105  |  35<H>  ----------------------------<  75  3.4<L>   |  27  |  1.54<H>    Ca    8.4      27 Mar 2020 06:23  Phos  2.4     03-27  Mg     2.5     03-27    TPro  7.2  /  Alb  2.1<L>  /  TBili  0.5  /  DBili  x   /  AST  71<H>  /  ALT  27  /  AlkPhos  69  03-27        CAPILLARY BLOOD GLUCOSE      POCT Blood Glucose.: 85 mg/dL (27 Mar 2020 12:20)  POCT Blood Glucose.: 84 mg/dL (27 Mar 2020 08:13)  POCT Blood Glucose.: 121 mg/dL (27 Mar 2020 00:04)  POCT Blood Glucose.: 69 mg/dL (26 Mar 2020 22:13)  POCT Blood Glucose.: 85 mg/dL (26 Mar 2020 17:30)        RADIOLOGY & ADDITIONAL TESTS:    Imaging Personally Reviewed:  YES/NO    Consultant(s) Notes Reviewed:   YES/ No    Care Discussed with Consultants :     Plan of care was discussed with patient and /or primary care giver; all questions and concerns were addressed and care was aligned with patient's wishes.

## 2020-03-27 NOTE — PROGRESS NOTE ADULT - PROBLEM SELECTOR PLAN 7
IMPROVE VTE Individual Risk Assessment          RISK                                                          Points  [  ] Previous VTE                                                3  [  ] Thrombophilia                                             2  [  ] Lower limb paralysis                                   2        (unable to hold up >15 seconds)    [  ] Current Cancer                                             2         (within 6 months)  [ X ] Immobilization > 24 hrs                              1  [  ] ICU/CCU stay > 24 hours                             1  [X  ] Age > 60                                                         1    IMPROVE VTE Score: 2    lovenox subq for dvt ppx IMPROVE VTE Individual Risk Assessment          RISK                                                          Points  [  ] Previous VTE                                                3  [  ] Thrombophilia                                             2  [  ] Lower limb paralysis                                   2        (unable to hold up >15 seconds)    [  ] Current Cancer                                             2         (within 6 months)  [ X ] Immobilization > 24 hrs                              1  [  ] ICU/CCU stay > 24 hours                             1  [X  ] Age > 60                                                         1    IMPROVE VTE Score: 2  -Lovenox for DVT PPX  lovenox subq for dvt ppx

## 2020-03-27 NOTE — PROGRESS NOTE ADULT - PROBLEM SELECTOR PLAN 1
Patient was placed on non  rebreather because she was desaturating to 80s on nasal canula.  COVID +ve.  will start hydroxychloroquine from today . Patient was placed on non  rebreather because she was desaturating to 80s on nasal canula.  -patient on non rebreather saturating 93%  COVID +ve.  -on hydroxychloroquine .

## 2020-03-28 DIAGNOSIS — J18.9 PNEUMONIA, UNSPECIFIED ORGANISM: ICD-10-CM

## 2020-03-28 LAB
ALBUMIN SERPL ELPH-MCNC: 2 G/DL — LOW (ref 3.5–5)
ALP SERPL-CCNC: 67 U/L — SIGNIFICANT CHANGE UP (ref 40–120)
ALT FLD-CCNC: 37 U/L DA — SIGNIFICANT CHANGE UP (ref 10–60)
ANION GAP SERPL CALC-SCNC: 7 MMOL/L — SIGNIFICANT CHANGE UP (ref 5–17)
AST SERPL-CCNC: 76 U/L — HIGH (ref 10–40)
BILIRUB SERPL-MCNC: 0.5 MG/DL — SIGNIFICANT CHANGE UP (ref 0.2–1.2)
BUN SERPL-MCNC: 45 MG/DL — HIGH (ref 7–18)
CALCIUM SERPL-MCNC: 8.3 MG/DL — LOW (ref 8.4–10.5)
CHLORIDE SERPL-SCNC: 106 MMOL/L — SIGNIFICANT CHANGE UP (ref 96–108)
CO2 SERPL-SCNC: 25 MMOL/L — SIGNIFICANT CHANGE UP (ref 22–31)
CREAT SERPL-MCNC: 1.67 MG/DL — HIGH (ref 0.5–1.3)
GLUCOSE BLDC GLUCOMTR-MCNC: 111 MG/DL — HIGH (ref 70–99)
GLUCOSE BLDC GLUCOMTR-MCNC: 117 MG/DL — HIGH (ref 70–99)
GLUCOSE BLDC GLUCOMTR-MCNC: 136 MG/DL — HIGH (ref 70–99)
GLUCOSE BLDC GLUCOMTR-MCNC: 149 MG/DL — HIGH (ref 70–99)
GLUCOSE SERPL-MCNC: 93 MG/DL — SIGNIFICANT CHANGE UP (ref 70–99)
HCT VFR BLD CALC: 31.4 % — LOW (ref 34.5–45)
HGB BLD-MCNC: 10.2 G/DL — LOW (ref 11.5–15.5)
MAGNESIUM SERPL-MCNC: 2.6 MG/DL — SIGNIFICANT CHANGE UP (ref 1.6–2.6)
MCHC RBC-ENTMCNC: 31.7 PG — SIGNIFICANT CHANGE UP (ref 27–34)
MCHC RBC-ENTMCNC: 32.5 GM/DL — SIGNIFICANT CHANGE UP (ref 32–36)
MCV RBC AUTO: 97.5 FL — SIGNIFICANT CHANGE UP (ref 80–100)
NRBC # BLD: 0 /100 WBCS — SIGNIFICANT CHANGE UP (ref 0–0)
PHOSPHATE SERPL-MCNC: 2.4 MG/DL — LOW (ref 2.5–4.5)
PLATELET # BLD AUTO: 216 K/UL — SIGNIFICANT CHANGE UP (ref 150–400)
POTASSIUM SERPL-MCNC: 4 MMOL/L — SIGNIFICANT CHANGE UP (ref 3.5–5.3)
POTASSIUM SERPL-SCNC: 4 MMOL/L — SIGNIFICANT CHANGE UP (ref 3.5–5.3)
PROT SERPL-MCNC: 7.3 G/DL — SIGNIFICANT CHANGE UP (ref 6–8.3)
RBC # BLD: 3.22 M/UL — LOW (ref 3.8–5.2)
RBC # FLD: 13.2 % — SIGNIFICANT CHANGE UP (ref 10.3–14.5)
SODIUM SERPL-SCNC: 138 MMOL/L — SIGNIFICANT CHANGE UP (ref 135–145)
WBC # BLD: 10.6 K/UL — HIGH (ref 3.8–10.5)
WBC # FLD AUTO: 10.6 K/UL — HIGH (ref 3.8–10.5)

## 2020-03-28 PROCEDURE — 99233 SBSQ HOSP IP/OBS HIGH 50: CPT | Mod: GC

## 2020-03-28 RX ADMIN — AMLODIPINE BESYLATE 10 MILLIGRAM(S): 2.5 TABLET ORAL at 05:51

## 2020-03-28 RX ADMIN — Medication 10 MILLIGRAM(S): at 05:52

## 2020-03-28 RX ADMIN — MEMANTINE HYDROCHLORIDE 10 MILLIGRAM(S): 10 TABLET ORAL at 17:40

## 2020-03-28 RX ADMIN — PANTOPRAZOLE SODIUM 40 MILLIGRAM(S): 20 TABLET, DELAYED RELEASE ORAL at 06:01

## 2020-03-28 RX ADMIN — ENOXAPARIN SODIUM 40 MILLIGRAM(S): 100 INJECTION SUBCUTANEOUS at 13:21

## 2020-03-28 RX ADMIN — Medication 25 MILLIGRAM(S): at 13:21

## 2020-03-28 RX ADMIN — LOSARTAN POTASSIUM 100 MILLIGRAM(S): 100 TABLET, FILM COATED ORAL at 05:52

## 2020-03-28 RX ADMIN — Medication 200 MILLIGRAM(S): at 05:51

## 2020-03-28 RX ADMIN — MEMANTINE HYDROCHLORIDE 10 MILLIGRAM(S): 10 TABLET ORAL at 05:51

## 2020-03-28 RX ADMIN — ATORVASTATIN CALCIUM 40 MILLIGRAM(S): 80 TABLET, FILM COATED ORAL at 22:05

## 2020-03-28 RX ADMIN — Medication 25 MILLIGRAM(S): at 21:55

## 2020-03-28 RX ADMIN — Medication 10 MILLIGRAM(S): at 17:39

## 2020-03-28 RX ADMIN — Medication 200 MILLIGRAM(S): at 17:40

## 2020-03-28 RX ADMIN — Medication 100 MILLIGRAM(S): at 05:53

## 2020-03-28 RX ADMIN — Medication 25 MILLIGRAM(S): at 05:51

## 2020-03-28 RX ADMIN — CEFTRIAXONE 100 MILLIGRAM(S): 500 INJECTION, POWDER, FOR SOLUTION INTRAMUSCULAR; INTRAVENOUS at 13:20

## 2020-03-28 RX ADMIN — AZITHROMYCIN 500 MILLIGRAM(S): 500 TABLET, FILM COATED ORAL at 13:20

## 2020-03-28 NOTE — PROGRESS NOTE ADULT - PROBLEM SELECTOR PLAN 3
ARF with possible TAN in the setting of sepsis; monitor renal functions  Avoid nephrotoxins    avoid nephrotoxins  gentle hydration  hold home med lasix  bmp daily

## 2020-03-28 NOTE — PROGRESS NOTE ADULT - SUBJECTIVE AND OBJECTIVE BOX
MEDICAL ATTENDING NOTE    Patient is a 89y old  Female who presents with a chief complaint of acute hypoxic respiratory failure (27 Mar 2020 14:20)      INTERVAL HPI/OVERNIGHT EVENTS: no new complaints    MEDICATIONS  (STANDING):  amLODIPine   Tablet 10 milliGRAM(s) Oral daily  atorvastatin 40 milliGRAM(s) Oral at bedtime  azithromycin   Tablet 500 milliGRAM(s) Oral daily  cefTRIAXone   IVPB      cefTRIAXone   IVPB 1000 milliGRAM(s) IV Intermittent every 24 hours  enoxaparin Injectable 40 milliGRAM(s) SubCutaneous daily  hydrALAZINE 25 milliGRAM(s) Oral every 8 hours  hydroxychloroquine 200 milliGRAM(s) Oral every 12 hours  hydroxychloroquine   Oral   insulin lispro (HumaLOG) corrective regimen sliding scale   SubCutaneous three times a day before meals  insulin lispro (HumaLOG) corrective regimen sliding scale   SubCutaneous at bedtime  losartan 100 milliGRAM(s) Oral daily  memantine 10 milliGRAM(s) Oral two times a day  metoprolol succinate  milliGRAM(s) Oral daily  oxybutynin 10 milliGRAM(s) Oral two times a day  pantoprazole    Tablet 40 milliGRAM(s) Oral before breakfast  sodium chloride 0.9%. 1000 milliLiter(s) (60 mL/Hr) IV Continuous <Continuous>    MEDICATIONS  (PRN):  acetaminophen   Tablet .. 650 milliGRAM(s) Oral every 6 hours PRN Temp greater or equal to 38C (100.4F)  dextrose 40% Gel 15 Gram(s) Oral once PRN Blood Glucose LESS THAN 70 milliGRAM(s)/deciliter  guaiFENesin   Syrup  (Sugar-Free) 200 milliGRAM(s) Oral every 6 hours PRN Cough      __________________________________________________  ----------------------------------------------------------------------------------  REVIEW OF SYSTEMS: hypoxic+; no fever      Vital Signs Last 24 Hrs  T(C): 36.4 (28 Mar 2020 18:40), Max: 37 (28 Mar 2020 10:25)  T(F): 97.6 (28 Mar 2020 18:40), Max: 98.6 (28 Mar 2020 10:25)  HR: 75 (28 Mar 2020 18:40) (67 - 82)  BP: 164/69 (28 Mar 2020 18:40) (115/85 - 164/69)  BP(mean): 91 (28 Mar 2020 13:20) (91 - 91)  RR: 17 (28 Mar 2020 18:40) (17 - 18)  SpO2: 95% (28 Mar 2020 18:40) (95% - 98%)    _________________  PHYSICAL EXAM:  ---------------------------   NAD; Normocephalic;   LUNGS - no wheezing  HEART: S1 S2+   ABDOMEN: Soft, Nontender, non distended  EXTREMITIES: no cyanosis; no edema  NERVOUS SYSTEM:  Awake and alert; no new deficits    _________________________________________________  LABS:                        10.2   10.60 )-----------( 216      ( 28 Mar 2020 05:56 )             31.4     03-28    138  |  106  |  45<H>  ----------------------------<  93  4.0   |  25  |  1.67<H>    Ca    8.3<L>      28 Mar 2020 05:56  Phos  2.4     03-28  Mg     2.6     03-28    TPro  7.3  /  Alb  2.0<L>  /  TBili  0.5  /  DBili  x   /  AST  76<H>  /  ALT  37  /  AlkPhos  67  03-28        CAPILLARY BLOOD GLUCOSE      POCT Blood Glucose.: 117 mg/dL (28 Mar 2020 16:36)  POCT Blood Glucose.: 136 mg/dL (28 Mar 2020 12:20)  POCT Blood Glucose.: 111 mg/dL (28 Mar 2020 08:09)  POCT Blood Glucose.: 122 mg/dL (27 Mar 2020 22:47)  POCT Blood Glucose.: 58 mg/dL (27 Mar 2020 22:07)

## 2020-03-29 LAB
ALBUMIN SERPL ELPH-MCNC: 2.1 G/DL — LOW (ref 3.5–5)
ALP SERPL-CCNC: 75 U/L — SIGNIFICANT CHANGE UP (ref 40–120)
ALT FLD-CCNC: 40 U/L DA — SIGNIFICANT CHANGE UP (ref 10–60)
ANION GAP SERPL CALC-SCNC: 10 MMOL/L — SIGNIFICANT CHANGE UP (ref 5–17)
AST SERPL-CCNC: 69 U/L — HIGH (ref 10–40)
BILIRUB SERPL-MCNC: 0.5 MG/DL — SIGNIFICANT CHANGE UP (ref 0.2–1.2)
BUN SERPL-MCNC: 37 MG/DL — HIGH (ref 7–18)
CALCIUM SERPL-MCNC: 8.5 MG/DL — SIGNIFICANT CHANGE UP (ref 8.4–10.5)
CHLORIDE SERPL-SCNC: 105 MMOL/L — SIGNIFICANT CHANGE UP (ref 96–108)
CO2 SERPL-SCNC: 23 MMOL/L — SIGNIFICANT CHANGE UP (ref 22–31)
CREAT SERPL-MCNC: 1.47 MG/DL — HIGH (ref 0.5–1.3)
GLUCOSE BLDC GLUCOMTR-MCNC: 122 MG/DL — HIGH (ref 70–99)
GLUCOSE BLDC GLUCOMTR-MCNC: 152 MG/DL — HIGH (ref 70–99)
GLUCOSE BLDC GLUCOMTR-MCNC: 168 MG/DL — HIGH (ref 70–99)
GLUCOSE BLDC GLUCOMTR-MCNC: 239 MG/DL — HIGH (ref 70–99)
GLUCOSE SERPL-MCNC: 158 MG/DL — HIGH (ref 70–99)
HCT VFR BLD CALC: 29.5 % — LOW (ref 34.5–45)
HGB BLD-MCNC: 9.5 G/DL — LOW (ref 11.5–15.5)
MAGNESIUM SERPL-MCNC: 2.8 MG/DL — HIGH (ref 1.6–2.6)
MCHC RBC-ENTMCNC: 31.5 PG — SIGNIFICANT CHANGE UP (ref 27–34)
MCHC RBC-ENTMCNC: 32.2 GM/DL — SIGNIFICANT CHANGE UP (ref 32–36)
MCV RBC AUTO: 97.7 FL — SIGNIFICANT CHANGE UP (ref 80–100)
NRBC # BLD: 0 /100 WBCS — SIGNIFICANT CHANGE UP (ref 0–0)
PHOSPHATE SERPL-MCNC: 3.3 MG/DL — SIGNIFICANT CHANGE UP (ref 2.5–4.5)
PLATELET # BLD AUTO: 230 K/UL — SIGNIFICANT CHANGE UP (ref 150–400)
POTASSIUM SERPL-MCNC: 4 MMOL/L — SIGNIFICANT CHANGE UP (ref 3.5–5.3)
POTASSIUM SERPL-SCNC: 4 MMOL/L — SIGNIFICANT CHANGE UP (ref 3.5–5.3)
PROT SERPL-MCNC: 7.3 G/DL — SIGNIFICANT CHANGE UP (ref 6–8.3)
RBC # BLD: 3.02 M/UL — LOW (ref 3.8–5.2)
RBC # FLD: 13.2 % — SIGNIFICANT CHANGE UP (ref 10.3–14.5)
SODIUM SERPL-SCNC: 138 MMOL/L — SIGNIFICANT CHANGE UP (ref 135–145)
WBC # BLD: 12.51 K/UL — HIGH (ref 3.8–10.5)
WBC # FLD AUTO: 12.51 K/UL — HIGH (ref 3.8–10.5)

## 2020-03-29 PROCEDURE — 99232 SBSQ HOSP IP/OBS MODERATE 35: CPT

## 2020-03-29 RX ORDER — MORPHINE SULFATE 50 MG/1
0.5 CAPSULE, EXTENDED RELEASE ORAL ONCE
Refills: 0 | Status: DISCONTINUED | OUTPATIENT
Start: 2020-03-29 | End: 2020-03-29

## 2020-03-29 RX ORDER — HALOPERIDOL DECANOATE 100 MG/ML
2 INJECTION INTRAMUSCULAR ONCE
Refills: 0 | Status: COMPLETED | OUTPATIENT
Start: 2020-03-29 | End: 2020-03-29

## 2020-03-29 RX ORDER — HYDROXYCHLOROQUINE SULFATE 200 MG
1 TABLET ORAL
Qty: 0 | Refills: 0 | DISCHARGE
Start: 2020-03-29

## 2020-03-29 RX ORDER — FUROSEMIDE 40 MG
1 TABLET ORAL
Qty: 0 | Refills: 0 | DISCHARGE

## 2020-03-29 RX ORDER — AZITHROMYCIN 500 MG/1
1 TABLET, FILM COATED ORAL
Qty: 0 | Refills: 0 | DISCHARGE
Start: 2020-03-29

## 2020-03-29 RX ORDER — HALOPERIDOL DECANOATE 100 MG/ML
1 INJECTION INTRAMUSCULAR ONCE
Refills: 0 | Status: COMPLETED | OUTPATIENT
Start: 2020-03-29 | End: 2020-03-29

## 2020-03-29 RX ADMIN — Medication 1: at 08:17

## 2020-03-29 RX ADMIN — MORPHINE SULFATE 0.5 MILLIGRAM(S): 50 CAPSULE, EXTENDED RELEASE ORAL at 15:00

## 2020-03-29 RX ADMIN — Medication 200 MILLIGRAM(S): at 06:16

## 2020-03-29 RX ADMIN — HALOPERIDOL DECANOATE 2 MILLIGRAM(S): 100 INJECTION INTRAMUSCULAR at 03:45

## 2020-03-29 RX ADMIN — Medication 200 MILLIGRAM(S): at 17:30

## 2020-03-29 RX ADMIN — Medication 2: at 17:29

## 2020-03-29 RX ADMIN — MEMANTINE HYDROCHLORIDE 10 MILLIGRAM(S): 10 TABLET ORAL at 17:30

## 2020-03-29 RX ADMIN — AZITHROMYCIN 500 MILLIGRAM(S): 500 TABLET, FILM COATED ORAL at 12:08

## 2020-03-29 RX ADMIN — PANTOPRAZOLE SODIUM 40 MILLIGRAM(S): 20 TABLET, DELAYED RELEASE ORAL at 06:18

## 2020-03-29 RX ADMIN — Medication 25 MILLIGRAM(S): at 06:16

## 2020-03-29 RX ADMIN — HALOPERIDOL DECANOATE 1 MILLIGRAM(S): 100 INJECTION INTRAMUSCULAR at 11:39

## 2020-03-29 RX ADMIN — Medication 25 MILLIGRAM(S): at 13:50

## 2020-03-29 RX ADMIN — ENOXAPARIN SODIUM 40 MILLIGRAM(S): 100 INJECTION SUBCUTANEOUS at 11:40

## 2020-03-29 RX ADMIN — Medication 1: at 12:09

## 2020-03-29 RX ADMIN — Medication 100 MILLIGRAM(S): at 06:18

## 2020-03-29 RX ADMIN — Medication 10 MILLIGRAM(S): at 06:18

## 2020-03-29 RX ADMIN — AMLODIPINE BESYLATE 10 MILLIGRAM(S): 2.5 TABLET ORAL at 06:16

## 2020-03-29 RX ADMIN — LOSARTAN POTASSIUM 100 MILLIGRAM(S): 100 TABLET, FILM COATED ORAL at 06:17

## 2020-03-29 RX ADMIN — CEFTRIAXONE 100 MILLIGRAM(S): 500 INJECTION, POWDER, FOR SOLUTION INTRAMUSCULAR; INTRAVENOUS at 12:08

## 2020-03-29 RX ADMIN — Medication 10 MILLIGRAM(S): at 17:30

## 2020-03-29 RX ADMIN — MEMANTINE HYDROCHLORIDE 10 MILLIGRAM(S): 10 TABLET ORAL at 06:18

## 2020-03-29 NOTE — PROGRESS NOTE ADULT - SUBJECTIVE AND OBJECTIVE BOX
Patient is a 89y old  Female who presents with a chief complaint of acute hypoxic respiratory failure (29 Mar 2020 10:35)    HPI:  88 y/o female from [facility] with PMHx of HTN, DM, dementia (AAOx1-2) and HLD is sent to the ED for shortness of breath. Patient is a poor historian and denies any complaints. She denies cough or fever and reports feeling well at present. ABG confirms hypoxemia with pO2 68 on admission. Patient currently saturating at 97% on 4L NC. She is in no acute distress.    GOC: As per AL documents, patient is DNR. Will need to readdress this with family in AM to confirm if patient is both DNR/DNI. (25 Mar 2020 02:50)    Today: pt found to be confused and hypoixa in her bed. Pt unable to answer questions. On 10L NRB.   ros can not asses.     PAST MEDICAL & SURGICAL HISTORY:  Dementia  CVA (cerebral vascular accident)  Colon cancer  HTN (hypertension)  DM (diabetes mellitus)    MEDICATIONS  (STANDING):  amLODIPine   Tablet 10 milliGRAM(s) Oral daily  atorvastatin 40 milliGRAM(s) Oral at bedtime  azithromycin   Tablet 500 milliGRAM(s) Oral daily  cefTRIAXone   IVPB      cefTRIAXone   IVPB 1000 milliGRAM(s) IV Intermittent every 24 hours  enoxaparin Injectable 40 milliGRAM(s) SubCutaneous daily  hydrALAZINE 25 milliGRAM(s) Oral every 8 hours  hydroxychloroquine 200 milliGRAM(s) Oral every 12 hours  hydroxychloroquine   Oral   insulin lispro (HumaLOG) corrective regimen sliding scale   SubCutaneous three times a day before meals  insulin lispro (HumaLOG) corrective regimen sliding scale   SubCutaneous at bedtime  losartan 100 milliGRAM(s) Oral daily  memantine 10 milliGRAM(s) Oral two times a day  metoprolol succinate  milliGRAM(s) Oral daily  oxybutynin 10 milliGRAM(s) Oral two times a day  pantoprazole    Tablet 40 milliGRAM(s) Oral before breakfast  sodium chloride 0.9%. 1000 milliLiter(s) (60 mL/Hr) IV Continuous <Continuous>    MEDICATIONS  (PRN):  acetaminophen   Tablet .. 650 milliGRAM(s) Oral every 6 hours PRN Temp greater or equal to 38C (100.4F)  dextrose 40% Gel 15 Gram(s) Oral once PRN Blood Glucose LESS THAN 70 milliGRAM(s)/deciliter  guaiFENesin   Syrup  (Sugar-Free) 200 milliGRAM(s) Oral every 6 hours PRN Cough      EXAM:  Vital Signs Last 24 Hrs  T(C): 36.4 (30 Mar 2020 06:04), Max: 36.8 (29 Mar 2020 14:20)  T(F): 97.6 (30 Mar 2020 06:04), Max: 98.3 (29 Mar 2020 14:20)  HR: 72 (30 Mar 2020 06:04) (69 - 86)  BP: 152/81 (30 Mar 2020 06:04) (130/64 - 161/83)  BP(mean): --  RR: 20 (30 Mar 2020 06:04) (18 - 22)  SpO2: 99% (30 Mar 2020 06:04) (89% - 99%)      PHYSICAL EXAM:  Constitutional: awake, tacypneic on nrb  Neck: supple  Respiratory: bilaterally rhonchi and decreased air entry  Neurological: agitated  Skin: intact no rash, warm to touch.   Musculoskeletal: moves all 4 extremities  Psychiatric: can not asses    LABS:      LIVER FUNCTIONS - ( 29 Mar 2020 08:41 )  Alb: 2.1 g/dL / Pro: 7.3 g/dL / ALK PHOS: 75 U/L / ALT: 40 U/L DA / AST: 69 U/L / GGT: x                                 9.5    13.79 )-----------( 290      ( 30 Mar 2020 08:15 )             30.2     03-29    138  |  105  |  37<H>  ----------------------------<  158<H>  4.0   |  23  |  1.47<H>    Ca    8.5      29 Mar 2020 08:41  Phos  3.3     03-29  Mg     2.8     03-29    TPro  7.3  /  Alb  2.1<L>  /  TBili  0.5  /  DBili  x   /  AST  69<H>  /  ALT  40  /  AlkPhos  75  03-29

## 2020-03-29 NOTE — PROGRESS NOTE ADULT - ASSESSMENT
89 y/o female admitted to medicine for hypoxic respiratory failure 2/2 to bilateral pneumonia, likely 2/2 to COVID-19.     Problem/Plan - 1:  ·  Problem: Acute hypoxemic respiratory failure.  Plan: Continue oxygen via NRM  Monitor closely. Clinically declining.   Low threshold for ICU     Problem/Plan - 2:  ·  Problem: Pneumonia.  Plan: Pneumonia due to novel corona virus with possible superimposed bacterial infection + sepsis  continue empiric antibiotics  continue to monitor closely     Problem/Plan - 3:  ·  Problem: MARIA E (acute kidney injury).   hold home med lasix  bmp daily.      Problem/Plan - 4:  ·  Problem: HTN (hypertension).  Plan: Labile BP; Continue current meds  Monitor BP.      Problem/Plan - 5:  ·  Problem: DM (diabetes mellitus).  Plan: Monitor f/s  continue insulin sliding scale.      Problem/Plan - 6:  Problem: Dementia. Plan: stable;   continue memantine.     Problem/Plan - 7:  ·  Problem: Need for prophylactic measure.  Plan: Continue Lovenox for DVT prophylaxis.

## 2020-03-29 NOTE — ACUTE INTERFACILITY TRANSFER NOTE - PLAN OF CARE
Prevent hypoxia Pt was admitted to medical floor for COVID-19 viral multifocal pneumonia. Plaquenil, Zithromax and Ceftriaxone was given. She required 100% Non-rebreather for her saturations of 94-95. DNR, DNI.

## 2020-03-29 NOTE — ACUTE INTERFACILITY TRANSFER NOTE - HOSPITAL COURSE
HPI:  88 y/o female from [facility] with PMHx of HTN, DM, dementia (AAOx1-2) and HLD is sent to the ED for shortness of breath. Patient is a poor historian and denies any complaints. She denies cough or fever and reports feeling well at present. ABG confirms hypoxemia with pO2 68 on admission. Patient currently saturating at 97% on 4L NC. She is in no acute distress.  GOC: DNR, DNI. (25 Mar 2020 02:50)  Pt was admitted to medical floor for COVID-19 viral multifocal pneumonia. Plaquenil, Zithromax and Ceftriaxone was given. She required 100% Non-rebreather for her saturations of 94-95. DNR, DNI.

## 2020-03-30 LAB
ALBUMIN SERPL ELPH-MCNC: 2.3 G/DL — LOW (ref 3.5–5)
ALP SERPL-CCNC: 83 U/L — SIGNIFICANT CHANGE UP (ref 40–120)
ALT FLD-CCNC: 42 U/L DA — SIGNIFICANT CHANGE UP (ref 10–60)
ANION GAP SERPL CALC-SCNC: 9 MMOL/L — SIGNIFICANT CHANGE UP (ref 5–17)
AST SERPL-CCNC: 68 U/L — HIGH (ref 10–40)
BILIRUB SERPL-MCNC: 0.4 MG/DL — SIGNIFICANT CHANGE UP (ref 0.2–1.2)
BUN SERPL-MCNC: 46 MG/DL — HIGH (ref 7–18)
CALCIUM SERPL-MCNC: 8.8 MG/DL — SIGNIFICANT CHANGE UP (ref 8.4–10.5)
CHLORIDE SERPL-SCNC: 108 MMOL/L — SIGNIFICANT CHANGE UP (ref 96–108)
CO2 SERPL-SCNC: 25 MMOL/L — SIGNIFICANT CHANGE UP (ref 22–31)
CREAT SERPL-MCNC: 1.79 MG/DL — HIGH (ref 0.5–1.3)
CULTURE RESULTS: SIGNIFICANT CHANGE UP
CULTURE RESULTS: SIGNIFICANT CHANGE UP
GLUCOSE BLDC GLUCOMTR-MCNC: 101 MG/DL — HIGH (ref 70–99)
GLUCOSE BLDC GLUCOMTR-MCNC: 116 MG/DL — HIGH (ref 70–99)
GLUCOSE BLDC GLUCOMTR-MCNC: 124 MG/DL — HIGH (ref 70–99)
GLUCOSE BLDC GLUCOMTR-MCNC: 139 MG/DL — HIGH (ref 70–99)
GLUCOSE SERPL-MCNC: 124 MG/DL — HIGH (ref 70–99)
HCT VFR BLD CALC: 30.2 % — LOW (ref 34.5–45)
HGB BLD-MCNC: 9.5 G/DL — LOW (ref 11.5–15.5)
MAGNESIUM SERPL-MCNC: 3 MG/DL — HIGH (ref 1.6–2.6)
MCHC RBC-ENTMCNC: 30.7 PG — SIGNIFICANT CHANGE UP (ref 27–34)
MCHC RBC-ENTMCNC: 31.5 GM/DL — LOW (ref 32–36)
MCV RBC AUTO: 97.7 FL — SIGNIFICANT CHANGE UP (ref 80–100)
NRBC # BLD: 0 /100 WBCS — SIGNIFICANT CHANGE UP (ref 0–0)
PHOSPHATE SERPL-MCNC: 3.7 MG/DL — SIGNIFICANT CHANGE UP (ref 2.5–4.5)
PLATELET # BLD AUTO: 290 K/UL — SIGNIFICANT CHANGE UP (ref 150–400)
POTASSIUM SERPL-MCNC: 4.1 MMOL/L — SIGNIFICANT CHANGE UP (ref 3.5–5.3)
POTASSIUM SERPL-SCNC: 4.1 MMOL/L — SIGNIFICANT CHANGE UP (ref 3.5–5.3)
PROT SERPL-MCNC: 7.7 G/DL — SIGNIFICANT CHANGE UP (ref 6–8.3)
RBC # BLD: 3.09 M/UL — LOW (ref 3.8–5.2)
RBC # FLD: 13.2 % — SIGNIFICANT CHANGE UP (ref 10.3–14.5)
SODIUM SERPL-SCNC: 142 MMOL/L — SIGNIFICANT CHANGE UP (ref 135–145)
SPECIMEN SOURCE: SIGNIFICANT CHANGE UP
SPECIMEN SOURCE: SIGNIFICANT CHANGE UP
WBC # BLD: 13.79 K/UL — HIGH (ref 3.8–10.5)
WBC # FLD AUTO: 13.79 K/UL — HIGH (ref 3.8–10.5)

## 2020-03-30 PROCEDURE — 99232 SBSQ HOSP IP/OBS MODERATE 35: CPT | Mod: GC

## 2020-03-30 PROCEDURE — 71045 X-RAY EXAM CHEST 1 VIEW: CPT | Mod: 26

## 2020-03-30 RX ORDER — MORPHINE SULFATE 50 MG/1
0.5 CAPSULE, EXTENDED RELEASE ORAL ONCE
Refills: 0 | Status: DISCONTINUED | OUTPATIENT
Start: 2020-03-30 | End: 2020-03-30

## 2020-03-30 RX ADMIN — MEMANTINE HYDROCHLORIDE 10 MILLIGRAM(S): 10 TABLET ORAL at 17:34

## 2020-03-30 RX ADMIN — CEFTRIAXONE 100 MILLIGRAM(S): 500 INJECTION, POWDER, FOR SOLUTION INTRAMUSCULAR; INTRAVENOUS at 12:33

## 2020-03-30 RX ADMIN — MORPHINE SULFATE 0.5 MILLIGRAM(S): 50 CAPSULE, EXTENDED RELEASE ORAL at 12:34

## 2020-03-30 RX ADMIN — PANTOPRAZOLE SODIUM 40 MILLIGRAM(S): 20 TABLET, DELAYED RELEASE ORAL at 06:40

## 2020-03-30 RX ADMIN — LOSARTAN POTASSIUM 100 MILLIGRAM(S): 100 TABLET, FILM COATED ORAL at 06:39

## 2020-03-30 RX ADMIN — AMLODIPINE BESYLATE 10 MILLIGRAM(S): 2.5 TABLET ORAL at 06:38

## 2020-03-30 RX ADMIN — Medication 25 MILLIGRAM(S): at 22:28

## 2020-03-30 RX ADMIN — Medication 25 MILLIGRAM(S): at 12:35

## 2020-03-30 RX ADMIN — Medication 25 MILLIGRAM(S): at 06:38

## 2020-03-30 RX ADMIN — ATORVASTATIN CALCIUM 40 MILLIGRAM(S): 80 TABLET, FILM COATED ORAL at 00:49

## 2020-03-30 RX ADMIN — Medication 25 MILLIGRAM(S): at 00:52

## 2020-03-30 RX ADMIN — Medication 200 MILLIGRAM(S): at 17:33

## 2020-03-30 RX ADMIN — Medication 200 MILLIGRAM(S): at 06:38

## 2020-03-30 RX ADMIN — Medication 10 MILLIGRAM(S): at 06:39

## 2020-03-30 RX ADMIN — Medication 100 MILLIGRAM(S): at 06:42

## 2020-03-30 RX ADMIN — MEMANTINE HYDROCHLORIDE 10 MILLIGRAM(S): 10 TABLET ORAL at 06:40

## 2020-03-30 RX ADMIN — Medication 10 MILLIGRAM(S): at 17:33

## 2020-03-30 RX ADMIN — ENOXAPARIN SODIUM 40 MILLIGRAM(S): 100 INJECTION SUBCUTANEOUS at 12:33

## 2020-03-30 RX ADMIN — ATORVASTATIN CALCIUM 40 MILLIGRAM(S): 80 TABLET, FILM COATED ORAL at 22:28

## 2020-03-30 NOTE — PROGRESS NOTE ADULT - SUBJECTIVE AND OBJECTIVE BOX
PGY 1 Note discussed with supervising resident and primary attending    Patient is a 89y old  Female who presents with a chief complaint of acute hypoxic respiratory failure (29 Mar 2020 10:35)      INTERVAL HPI/OVERNIGHT EVENTS: Patient was seen and examined at bedside,  was tachypneic on NRB and is saturating to 91%.  a febrile .  MEDICATIONS  (STANDING):  amLODIPine   Tablet 10 milliGRAM(s) Oral daily  atorvastatin 40 milliGRAM(s) Oral at bedtime  azithromycin   Tablet 500 milliGRAM(s) Oral daily  cefTRIAXone   IVPB      cefTRIAXone   IVPB 1000 milliGRAM(s) IV Intermittent every 24 hours  enoxaparin Injectable 40 milliGRAM(s) SubCutaneous daily  hydrALAZINE 25 milliGRAM(s) Oral every 8 hours  hydroxychloroquine 200 milliGRAM(s) Oral every 12 hours  hydroxychloroquine   Oral   insulin lispro (HumaLOG) corrective regimen sliding scale   SubCutaneous three times a day before meals  insulin lispro (HumaLOG) corrective regimen sliding scale   SubCutaneous at bedtime  losartan 100 milliGRAM(s) Oral daily  memantine 10 milliGRAM(s) Oral two times a day  metoprolol succinate  milliGRAM(s) Oral daily  morphine  - Injectable 0.5 milliGRAM(s) IV Push once  oxybutynin 10 milliGRAM(s) Oral two times a day  pantoprazole    Tablet 40 milliGRAM(s) Oral before breakfast  sodium chloride 0.9%. 1000 milliLiter(s) (60 mL/Hr) IV Continuous <Continuous>    MEDICATIONS  (PRN):  acetaminophen   Tablet .. 650 milliGRAM(s) Oral every 6 hours PRN Temp greater or equal to 38C (100.4F)  dextrose 40% Gel 15 Gram(s) Oral once PRN Blood Glucose LESS THAN 70 milliGRAM(s)/deciliter  guaiFENesin   Syrup  (Sugar-Free) 200 milliGRAM(s) Oral every 6 hours PRN Cough      __________________________________________________  REVIEW OF SYSTEMS:    CONSTITUTIONAL: No fever,   EYES: no acute visual disturbances  NECK: No pain or stiffness  RESPIRATORY: No cough; shortness of breath +ve  CARDIOVASCULAR: No chest pain, no palpitations  GASTROINTESTINAL: No pain. No nausea or vomiting; No diarrhea   NEUROLOGICAL: No headache or numbness, no tremors  MUSCULOSKELETAL: No joint pain, no muscle pain  GENITOURINARY: no dysuria, no frequency, no hesitancy  PSYCHIATRY: no depression , no anxiety  ALL OTHER  ROS negative        Vital Signs Last 24 Hrs  T(C): 36.7 (30 Mar 2020 11:44), Max: 36.8 (29 Mar 2020 14:20)  T(F): 98.1 (30 Mar 2020 11:44), Max: 98.3 (29 Mar 2020 14:20)  HR: 71 (30 Mar 2020 11:44) (69 - 86)  BP: 150/73 (30 Mar 2020 11:44) (133/58 - 161/83)  BP(mean): --  RR: 20 (30 Mar 2020 06:04) (18 - 22)  SpO2: 99% (30 Mar 2020 06:04) (89% - 99%)    ________________________________________________  PHYSICAL EXAM:  GENERAL: elderly female in respiratory distress  HEENT: Normocephalic;  conjunctivae and sclerae clear; moist mucous membranes;   NECK : supple  CHEST/LUNG: Rochi B/l. tacypneic  HEART: S1 S2  regular; no murmurs, gallops or rubs  ABDOMEN: Soft, Nontender, Nondistended; Bowel sounds present  EXTREMITIES: no cyanosis; no edema; no calf tenderness  SKIN: warm and dry; no rash  NERVOUS SYSTEM:  Awake and alert; Oriented  to place, person and time ; no new deficits    _________________________________________________  LABS:                        9.5    13.79 )-----------( 290      ( 30 Mar 2020 08:15 )             30.2     03-30    142  |  108  |  46<H>  ----------------------------<  124<H>  4.1   |  25  |  1.79<H>    Ca    8.8      30 Mar 2020 08:15  Phos  3.7     03-30  Mg     3.0     03-30    TPro  7.7  /  Alb  2.3<L>  /  TBili  0.4  /  DBili  x   /  AST  68<H>  /  ALT  42  /  AlkPhos  83  03-30        CAPILLARY BLOOD GLUCOSE      POCT Blood Glucose.: 139 mg/dL (30 Mar 2020 11:25)  POCT Blood Glucose.: 124 mg/dL (30 Mar 2020 07:39)  POCT Blood Glucose.: 122 mg/dL (29 Mar 2020 22:10)  POCT Blood Glucose.: 239 mg/dL (29 Mar 2020 16:49)        RADIOLOGY & ADDITIONAL TESTS:    Imaging Personally Reviewed:  YES/NO    Consultant(s) Notes Reviewed:   YES/ No    Care Discussed with Consultants :     Plan of care was discussed with patient and /or primary care giver; all questions and concerns were addressed and care was aligned with patient's wishes. PGY 1 Note discussed with supervising resident and primary attending    Patient is a 89y old  Female who presents with a chief complaint of acute hypoxic respiratory failure (29 Mar 2020 10:35)      INTERVAL HPI/OVERNIGHT EVENTS: Patient was seen and examined at bedside,  was tachypneic on NRB and is saturating to 91%.  a febrile .  MEDICATIONS  (STANDING):  amLODIPine   Tablet 10 milliGRAM(s) Oral daily  atorvastatin 40 milliGRAM(s) Oral at bedtime  azithromycin   Tablet 500 milliGRAM(s) Oral daily  cefTRIAXone   IVPB      cefTRIAXone   IVPB 1000 milliGRAM(s) IV Intermittent every 24 hours  enoxaparin Injectable 40 milliGRAM(s) SubCutaneous daily  hydrALAZINE 25 milliGRAM(s) Oral every 8 hours  hydroxychloroquine 200 milliGRAM(s) Oral every 12 hours  hydroxychloroquine   Oral   insulin lispro (HumaLOG) corrective regimen sliding scale   SubCutaneous three times a day before meals  insulin lispro (HumaLOG) corrective regimen sliding scale   SubCutaneous at bedtime  losartan 100 milliGRAM(s) Oral daily  memantine 10 milliGRAM(s) Oral two times a day  metoprolol succinate  milliGRAM(s) Oral daily  morphine  - Injectable 0.5 milliGRAM(s) IV Push once  oxybutynin 10 milliGRAM(s) Oral two times a day  pantoprazole    Tablet 40 milliGRAM(s) Oral before breakfast  sodium chloride 0.9%. 1000 milliLiter(s) (60 mL/Hr) IV Continuous <Continuous>    MEDICATIONS  (PRN):  acetaminophen   Tablet .. 650 milliGRAM(s) Oral every 6 hours PRN Temp greater or equal to 38C (100.4F)  dextrose 40% Gel 15 Gram(s) Oral once PRN Blood Glucose LESS THAN 70 milliGRAM(s)/deciliter  guaiFENesin   Syrup  (Sugar-Free) 200 milliGRAM(s) Oral every 6 hours PRN Cough      __________________________________________________  REVIEW OF SYSTEMS:    CONSTITUTIONAL: No fever,   EYES: no acute visual disturbances  NECK: No pain or stiffness  RESPIRATORY: No cough; shortness of breath +ve  CARDIOVASCULAR: No chest pain, no palpitations  GASTROINTESTINAL: No pain. No nausea or vomiting; No diarrhea   NEUROLOGICAL: No headache or numbness, no tremors  MUSCULOSKELETAL: No joint pain, no muscle pain  GENITOURINARY: no dysuria, no frequency, no hesitancy  PSYCHIATRY: no depression , no anxiety  ALL OTHER  ROS negative        Vital Signs Last 24 Hrs  T(C): 36.7 (30 Mar 2020 11:44), Max: 36.8 (29 Mar 2020 14:20)  T(F): 98.1 (30 Mar 2020 11:44), Max: 98.3 (29 Mar 2020 14:20)  HR: 71 (30 Mar 2020 11:44) (69 - 86)  BP: 150/73 (30 Mar 2020 11:44) (133/58 - 161/83)  RR: 20 (30 Mar 2020 06:04) (18 - 22)  SpO2: 99% (30 Mar 2020 06:04) (89% - 99%)    ________________________________________________  PHYSICAL EXAM:  GENERAL: elderly female in respiratory distress  HEENT: Normocephalic;  conjunctivae and sclerae clear; moist mucous membranes;   NECK : supple  CHEST/LUNG: Rochi B/l. tacypneic  HEART: S1 S2+  ABDOMEN: Soft, Nontender, Nondistended; Bowel sounds present  EXTREMITIES: no cyanosis; no edema; no calf tenderness  SKIN: warm and dry; no rash  NERVOUS SYSTEM:  Awake and alert; Oriented  to place, person and time.    _________________________________________________  LABS:                        9.5    13.79 )-----------( 290      ( 30 Mar 2020 08:15 )             30.2     03-30    142  |  108  |  46<H>  ----------------------------<  124<H>  4.1   |  25  |  1.79<H>    Ca    8.8      30 Mar 2020 08:15  Phos  3.7     03-30  Mg     3.0     03-30    TPro  7.7  /  Alb  2.3<L>  /  TBili  0.4  /  DBili  x   /  AST  68<H>  /  ALT  42  /  AlkPhos  83  03-30        CAPILLARY BLOOD GLUCOSE      POCT Blood Glucose.: 139 mg/dL (30 Mar 2020 11:25)  POCT Blood Glucose.: 124 mg/dL (30 Mar 2020 07:39)  POCT Blood Glucose.: 122 mg/dL (29 Mar 2020 22:10)  POCT Blood Glucose.: 239 mg/dL (29 Mar 2020 16:49)           Plan of care was discussed with patient

## 2020-03-30 NOTE — PROGRESS NOTE ADULT - PROBLEM SELECTOR PLAN 7
IMPROVE VTE Individual Risk Assessment          RISK                                                          Points  [  ] Previous VTE                                                3  [  ] Thrombophilia                                             2  [  ] Lower limb paralysis                                   2        (unable to hold up >15 seconds)    [  ] Current Cancer                                             2         (within 6 months)  [ X ] Immobilization > 24 hrs                              1  [  ] ICU/CCU stay > 24 hours                             1  [X  ] Age > 60                                                         1    IMPROVE VTE Score: 2  -Lovenox for DVT PPX  lovenox subq for dvt ppx

## 2020-03-30 NOTE — PROGRESS NOTE ADULT - ASSESSMENT
HPI:  88 y/o female from [facility] with PMHx of HTN, DM, dementia (AAOx1-2) and HLD is sent to the ED for shortness of breath. Patient is a poor historian and denies any complaints. She denies cough or fever and reports feeling well at present. ABG confirms hypoxemia with pO2 68 on admission. Patient currently saturating at 97% on 4L NC. She is in no acute distress.

## 2020-03-30 NOTE — PROGRESS NOTE ADULT - PROBLEM SELECTOR PLAN 1
Patient was placed on non  rebreather because she was desaturating to 80s on nasal canula.  -patient on non rebreather saturating 93%  COVID +ve.  -on hydroxychloroquine .

## 2020-03-30 NOTE — PROGRESS NOTE ADULT - PROBLEM SELECTOR PLAN 3
creatinine 1.6 on admission - baseline unknown  creatinine today is 1.79    avoid nephrotoxins  gentle hydration  hold home med lasix  bmp daily

## 2020-03-31 LAB
ALBUMIN SERPL ELPH-MCNC: 2.2 G/DL — LOW (ref 3.5–5)
ALP SERPL-CCNC: 92 U/L — SIGNIFICANT CHANGE UP (ref 40–120)
ALT FLD-CCNC: 44 U/L DA — SIGNIFICANT CHANGE UP (ref 10–60)
ANION GAP SERPL CALC-SCNC: 7 MMOL/L — SIGNIFICANT CHANGE UP (ref 5–17)
AST SERPL-CCNC: 67 U/L — HIGH (ref 10–40)
BILIRUB SERPL-MCNC: 0.4 MG/DL — SIGNIFICANT CHANGE UP (ref 0.2–1.2)
BUN SERPL-MCNC: 56 MG/DL — HIGH (ref 7–18)
CALCIUM SERPL-MCNC: 9 MG/DL — SIGNIFICANT CHANGE UP (ref 8.4–10.5)
CHLORIDE SERPL-SCNC: 113 MMOL/L — HIGH (ref 96–108)
CO2 SERPL-SCNC: 26 MMOL/L — SIGNIFICANT CHANGE UP (ref 22–31)
CREAT SERPL-MCNC: 1.99 MG/DL — HIGH (ref 0.5–1.3)
GLUCOSE BLDC GLUCOMTR-MCNC: 135 MG/DL — HIGH (ref 70–99)
GLUCOSE BLDC GLUCOMTR-MCNC: 135 MG/DL — HIGH (ref 70–99)
GLUCOSE BLDC GLUCOMTR-MCNC: 150 MG/DL — HIGH (ref 70–99)
GLUCOSE BLDC GLUCOMTR-MCNC: 99 MG/DL — SIGNIFICANT CHANGE UP (ref 70–99)
GLUCOSE SERPL-MCNC: 76 MG/DL — SIGNIFICANT CHANGE UP (ref 70–99)
HCT VFR BLD CALC: 32.2 % — LOW (ref 34.5–45)
HGB BLD-MCNC: 10 G/DL — LOW (ref 11.5–15.5)
MAGNESIUM SERPL-MCNC: 3.2 MG/DL — HIGH (ref 1.6–2.6)
MCHC RBC-ENTMCNC: 30.8 PG — SIGNIFICANT CHANGE UP (ref 27–34)
MCHC RBC-ENTMCNC: 31.1 GM/DL — LOW (ref 32–36)
MCV RBC AUTO: 99.1 FL — SIGNIFICANT CHANGE UP (ref 80–100)
NRBC # BLD: 0 /100 WBCS — SIGNIFICANT CHANGE UP (ref 0–0)
PHOSPHATE SERPL-MCNC: 3.9 MG/DL — SIGNIFICANT CHANGE UP (ref 2.5–4.5)
PLATELET # BLD AUTO: 332 K/UL — SIGNIFICANT CHANGE UP (ref 150–400)
POTASSIUM SERPL-MCNC: 3.9 MMOL/L — SIGNIFICANT CHANGE UP (ref 3.5–5.3)
POTASSIUM SERPL-SCNC: 3.9 MMOL/L — SIGNIFICANT CHANGE UP (ref 3.5–5.3)
PROT SERPL-MCNC: 7.6 G/DL — SIGNIFICANT CHANGE UP (ref 6–8.3)
RBC # BLD: 3.25 M/UL — LOW (ref 3.8–5.2)
RBC # FLD: 13.5 % — SIGNIFICANT CHANGE UP (ref 10.3–14.5)
SODIUM SERPL-SCNC: 146 MMOL/L — HIGH (ref 135–145)
WBC # BLD: 12.59 K/UL — HIGH (ref 3.8–10.5)
WBC # FLD AUTO: 12.59 K/UL — HIGH (ref 3.8–10.5)

## 2020-03-31 PROCEDURE — 99232 SBSQ HOSP IP/OBS MODERATE 35: CPT | Mod: GC

## 2020-03-31 RX ORDER — SODIUM CHLORIDE 9 MG/ML
1000 INJECTION, SOLUTION INTRAVENOUS
Refills: 0 | Status: DISCONTINUED | OUTPATIENT
Start: 2020-03-31 | End: 2020-04-08

## 2020-03-31 RX ADMIN — SODIUM CHLORIDE 50 MILLILITER(S): 9 INJECTION, SOLUTION INTRAVENOUS at 17:00

## 2020-03-31 RX ADMIN — Medication 10 MILLIGRAM(S): at 05:35

## 2020-03-31 RX ADMIN — Medication 25 MILLIGRAM(S): at 13:33

## 2020-03-31 RX ADMIN — ENOXAPARIN SODIUM 40 MILLIGRAM(S): 100 INJECTION SUBCUTANEOUS at 11:32

## 2020-03-31 RX ADMIN — MEMANTINE HYDROCHLORIDE 10 MILLIGRAM(S): 10 TABLET ORAL at 05:35

## 2020-03-31 RX ADMIN — Medication 10 MILLIGRAM(S): at 17:50

## 2020-03-31 RX ADMIN — AMLODIPINE BESYLATE 10 MILLIGRAM(S): 2.5 TABLET ORAL at 05:36

## 2020-03-31 RX ADMIN — PANTOPRAZOLE SODIUM 40 MILLIGRAM(S): 20 TABLET, DELAYED RELEASE ORAL at 05:36

## 2020-03-31 RX ADMIN — LOSARTAN POTASSIUM 100 MILLIGRAM(S): 100 TABLET, FILM COATED ORAL at 05:36

## 2020-03-31 RX ADMIN — Medication 25 MILLIGRAM(S): at 05:36

## 2020-03-31 RX ADMIN — ATORVASTATIN CALCIUM 40 MILLIGRAM(S): 80 TABLET, FILM COATED ORAL at 21:53

## 2020-03-31 RX ADMIN — MEMANTINE HYDROCHLORIDE 10 MILLIGRAM(S): 10 TABLET ORAL at 17:50

## 2020-03-31 RX ADMIN — Medication 100 MILLIGRAM(S): at 05:35

## 2020-03-31 RX ADMIN — Medication 25 MILLIGRAM(S): at 21:53

## 2020-03-31 NOTE — DIETITIAN INITIAL EVALUATION ADULT. - PERTINENT LABORATORY DATA
03-31 Na146 mmol/L<H> Glu 76 mg/dL K+ 3.9 mmol/L Cr  1.99 mg/dL<H> BUN 56 mg/dL<H>   03-31 Phos 3.9 mg/dL   03-31 Alb 2.2 g/dL<L>     03-25 [id number] 6.6 %<H>   03-25 Chol 92 mg/dL LDL 39 mg/dL HDL 39 mg/dL<L> Trig 69 mg/dL

## 2020-03-31 NOTE — PROGRESS NOTE ADULT - PROBLEM SELECTOR PLAN 1
Patient was placed on non  rebreather because she was desaturating to 80s on nasal canula.  -patient on non rebreather saturating 92%  COVID +ve.  -amipiric antibiotics completed

## 2020-03-31 NOTE — PROGRESS NOTE ADULT - SUBJECTIVE AND OBJECTIVE BOX
PGY 1 Note discussed with supervising resident and primary attending    Patient is a 89y old  Female who presents with a chief complaint of acute hypoxic respiratory failure (30 Mar 2020 12:13)      INTERVAL HPI/OVERNIGHT EVENTS:  Patient was seen and examined at bed side. no acute complains noted.    MEDICATIONS  (STANDING):  amLODIPine   Tablet 10 milliGRAM(s) Oral daily  atorvastatin 40 milliGRAM(s) Oral at bedtime  enoxaparin Injectable 40 milliGRAM(s) SubCutaneous daily  hydrALAZINE 25 milliGRAM(s) Oral every 8 hours  insulin lispro (HumaLOG) corrective regimen sliding scale   SubCutaneous three times a day before meals  insulin lispro (HumaLOG) corrective regimen sliding scale   SubCutaneous at bedtime  losartan 100 milliGRAM(s) Oral daily  memantine 10 milliGRAM(s) Oral two times a day  metoprolol succinate  milliGRAM(s) Oral daily  oxybutynin 10 milliGRAM(s) Oral two times a day  pantoprazole    Tablet 40 milliGRAM(s) Oral before breakfast  sodium chloride 0.9%. 1000 milliLiter(s) (60 mL/Hr) IV Continuous <Continuous>    MEDICATIONS  (PRN):  acetaminophen   Tablet .. 650 milliGRAM(s) Oral every 6 hours PRN Temp greater or equal to 38C (100.4F)  dextrose 40% Gel 15 Gram(s) Oral once PRN Blood Glucose LESS THAN 70 milliGRAM(s)/deciliter  guaiFENesin   Syrup  (Sugar-Free) 200 milliGRAM(s) Oral every 6 hours PRN Cough      __________________________________________________    Vital Signs Last 24 Hrs  T(C): 36.5 (31 Mar 2020 10:46), Max: 36.7 (30 Mar 2020 11:44)  T(F): 97.7 (31 Mar 2020 10:46), Max: 98.1 (30 Mar 2020 11:44)  HR: 73 (31 Mar 2020 10:46) (62 - 73)  BP: 137/45 (31 Mar 2020 10:46) (121/43 - 150/73)  BP(mean): --  RR: 20 (31 Mar 2020 10:46) (20 - 32)  SpO2: 100% (31 Mar 2020 10:46) (91% - 100%)    ________________________________________________  PHYSICAL EXAM:  GENERAL: Elderly female on NRB  HEENT: Normocephalic;  conjunctivae and sclerae clear; moist mucous membranes;   NECK : supple  CHEST/LUNG: Clear to auscultation bilaterally with good air entry   HEART: S1 S2  regular; no murmurs, gallops or rubs  ABDOMEN: Soft, Nontender, Nondistended; Bowel sounds present  EXTREMITIES: no cyanosis; no edema; no calf tenderness  SKIN: warm and dry; no rash  NERVOUS SYSTEM:  Awake and alert : no new deficits  _________________________________________________  LABS:                        10.0   12.59 )-----------( 332      ( 31 Mar 2020 06:29 )             32.2     03-31    146<H>  |  113<H>  |  56<H>  ----------------------------<  76  3.9   |  26  |  1.99<H>    Ca    9.0      31 Mar 2020 06:29  Phos  3.9     03-31  Mg     3.2     03-31    TPro  7.6  /  Alb  2.2<L>  /  TBili  0.4  /  DBili  x   /  AST  67<H>  /  ALT  44  /  AlkPhos  92  03-31        CAPILLARY BLOOD GLUCOSE      POCT Blood Glucose.: 99 mg/dL (31 Mar 2020 07:50)  POCT Blood Glucose.: 101 mg/dL (30 Mar 2020 21:52)  POCT Blood Glucose.: 116 mg/dL (30 Mar 2020 16:52)  POCT Blood Glucose.: 139 mg/dL (30 Mar 2020 11:25)        RADIOLOGY & ADDITIONAL TESTS:    Imaging Personally Reviewed:  YES/NO    Consultant(s) Notes Reviewed:   YES/ No    Care Discussed with Consultants :     Plan of care was discussed with patient and /or primary care giver; all questions and concerns were addressed and care was aligned with patient's wishes. PGY 1 Note discussed with supervising resident and primary attending    Patient is a 89y old  Female who presents with a chief complaint of acute hypoxic respiratory failure (30 Mar 2020 12:13)      INTERVAL HPI/OVERNIGHT EVENTS:  Patient was seen and examined at bed side. no acute complains noted.    MEDICATIONS  (STANDING):  amLODIPine   Tablet 10 milliGRAM(s) Oral daily  atorvastatin 40 milliGRAM(s) Oral at bedtime  enoxaparin Injectable 40 milliGRAM(s) SubCutaneous daily  hydrALAZINE 25 milliGRAM(s) Oral every 8 hours  insulin lispro (HumaLOG) corrective regimen sliding scale   SubCutaneous three times a day before meals  insulin lispro (HumaLOG) corrective regimen sliding scale   SubCutaneous at bedtime  losartan 100 milliGRAM(s) Oral daily  memantine 10 milliGRAM(s) Oral two times a day  metoprolol succinate  milliGRAM(s) Oral daily  oxybutynin 10 milliGRAM(s) Oral two times a day  pantoprazole    Tablet 40 milliGRAM(s) Oral before breakfast  sodium chloride 0.9%. 1000 milliLiter(s) (60 mL/Hr) IV Continuous <Continuous>    MEDICATIONS  (PRN):  acetaminophen   Tablet .. 650 milliGRAM(s) Oral every 6 hours PRN Temp greater or equal to 38C (100.4F)  dextrose 40% Gel 15 Gram(s) Oral once PRN Blood Glucose LESS THAN 70 milliGRAM(s)/deciliter  guaiFENesin   Syrup  (Sugar-Free) 200 milliGRAM(s) Oral every 6 hours PRN Cough      __________________________________________________    Vital Signs Last 24 Hrs  T(C): 36.5 (31 Mar 2020 10:46), Max: 36.7 (30 Mar 2020 11:44)  T(F): 97.7 (31 Mar 2020 10:46), Max: 98.1 (30 Mar 2020 11:44)  HR: 73 (31 Mar 2020 10:46) (62 - 73)  BP: 137/45 (31 Mar 2020 10:46) (121/43 - 150/73)  BP(mean): --  RR: 20 (31 Mar 2020 10:46) (20 - 32)  SpO2: 100% (31 Mar 2020 10:46) (91% - 100%)      ROS:  un able to do because patient was confused .    ________________________________________________  PHYSICAL EXAM:  GENERAL: Elderly female on NRB  HEENT: Normocephalic;  conjunctivae and sclerae clear; moist mucous membranes;   CHEST/LUNG: Crackles B/l  HEART: S1 S2  regular;ABDOMEN: Soft, Nontender, Nondistended; Bowel sounds present  EXTREMITIES: no cyanosis; no edema; no calf tenderness  Abdomen : soft , non tender , non distended.  _________________________________________________  LABS:                        10.0   12.59 )-----------( 332      ( 31 Mar 2020 06:29 )             32.2     03-31    146<H>  |  113<H>  |  56<H>  ----------------------------<  76  3.9   |  26  |  1.99<H>    Ca    9.0      31 Mar 2020 06:29  Phos  3.9     03-31  Mg     3.2     03-31    TPro  7.6  /  Alb  2.2<L>  /  TBili  0.4  /  DBili  x   /  AST  67<H>  /  ALT  44  /  AlkPhos  92  03-31        CAPILLARY BLOOD GLUCOSE      POCT Blood Glucose.: 99 mg/dL (31 Mar 2020 07:50)  POCT Blood Glucose.: 101 mg/dL (30 Mar 2020 21:52)  POCT Blood Glucose.: 116 mg/dL (30 Mar 2020 16:52)  POCT Blood Glucose.: 139 mg/dL (30 Mar 2020 11:25)        RADIOLOGY & ADDITIONAL TESTS:    Imaging Personally Reviewed:  YES/NO    Consultant(s) Notes Reviewed:   YES/ No    Care Discussed with Consultants :     Plan of care was discussed with patient and /or primary care giver; all questions and concerns were addressed and care was aligned with patient's wishes.

## 2020-03-31 NOTE — DIETITIAN INITIAL EVALUATION ADULT. - PERTINENT MEDS FT
MEDICATIONS  (STANDING):  amLODIPine   Tablet 10 milliGRAM(s) Oral daily  atorvastatin 40 milliGRAM(s) Oral at bedtime  azithromycin   Tablet 500 milliGRAM(s) Oral daily  cefTRIAXone   IVPB      cefTRIAXone   IVPB 1000 milliGRAM(s) IV Intermittent every 24 hours  enoxaparin Injectable 40 milliGRAM(s) SubCutaneous daily  hydrALAZINE 25 milliGRAM(s) Oral every 8 hours  insulin lispro (HumaLOG) corrective regimen sliding scale   SubCutaneous three times a day before meals  insulin lispro (HumaLOG) corrective regimen sliding scale   SubCutaneous at bedtime  losartan 100 milliGRAM(s) Oral daily  memantine 10 milliGRAM(s) Oral two times a day  metoprolol succinate  milliGRAM(s) Oral daily  oxybutynin 10 milliGRAM(s) Oral two times a day  pantoprazole    Tablet 40 milliGRAM(s) Oral before breakfast  sodium chloride 0.9%. 1000 milliLiter(s) (60 mL/Hr) IV Continuous <Continuous>

## 2020-03-31 NOTE — DIETITIAN INITIAL EVALUATION ADULT. - NS FNS WEIGHT USED FOR CALC
Ht=5' 2"   ELQ=249 lb    Admission li=090 lb?   Current dx=706.4 lb 3/25/2020; 132 lb 3/31/2020   BMI=24.1/current

## 2020-03-31 NOTE — DIETITIAN INITIAL EVALUATION ADULT. - OTHER INFO
Pt from assisted living facility, confused, poor historian with dementia, COVID19+beka, in airborne/contact isolation room; poor appetite per RN, x ? duration, no GI distress reported at present

## 2020-03-31 NOTE — PROGRESS NOTE ADULT - PROBLEM SELECTOR PLAN 3
creatinine 1.6 on admission - baseline unknown  \  Creatinine, Serum: 1.99 mg/dL (03.31.20 @ 06:29)  trended up , will encourage hydration    avoid nephrotoxins  gentle hydration  hold home med lasix  bmp daily

## 2020-03-31 NOTE — PROGRESS NOTE ADULT - ASSESSMENT
HPI:  88 y/o female from [facility] with PMHx of HTN, DM, dementia (AAOx1-2) and HLD is sent to the ED for shortness of breath. Patient is a poor historian and denies any complaints. She denies cough or fever and reports feeling well at present. ABG confirms hypoxemia with pO2 68 on admission. Patient currently saturating at 97% on 4L NC. She is in no acute distress.

## 2020-04-01 LAB
ANION GAP SERPL CALC-SCNC: 4 MMOL/L — LOW (ref 5–17)
BUN SERPL-MCNC: 51 MG/DL — HIGH (ref 7–18)
CALCIUM SERPL-MCNC: 9 MG/DL — SIGNIFICANT CHANGE UP (ref 8.4–10.5)
CHLORIDE SERPL-SCNC: 113 MMOL/L — HIGH (ref 96–108)
CO2 SERPL-SCNC: 28 MMOL/L — SIGNIFICANT CHANGE UP (ref 22–31)
CREAT SERPL-MCNC: 1.8 MG/DL — HIGH (ref 0.5–1.3)
GLUCOSE BLDC GLUCOMTR-MCNC: 119 MG/DL — HIGH (ref 70–99)
GLUCOSE BLDC GLUCOMTR-MCNC: 132 MG/DL — HIGH (ref 70–99)
GLUCOSE BLDC GLUCOMTR-MCNC: 133 MG/DL — HIGH (ref 70–99)
GLUCOSE BLDC GLUCOMTR-MCNC: 152 MG/DL — HIGH (ref 70–99)
GLUCOSE SERPL-MCNC: 144 MG/DL — HIGH (ref 70–99)
HCT VFR BLD CALC: 32.5 % — LOW (ref 34.5–45)
HGB BLD-MCNC: 10.1 G/DL — LOW (ref 11.5–15.5)
MCHC RBC-ENTMCNC: 31.1 GM/DL — LOW (ref 32–36)
MCHC RBC-ENTMCNC: 31.5 PG — SIGNIFICANT CHANGE UP (ref 27–34)
MCV RBC AUTO: 101.2 FL — HIGH (ref 80–100)
NRBC # BLD: 0 /100 WBCS — SIGNIFICANT CHANGE UP (ref 0–0)
PLATELET # BLD AUTO: 337 K/UL — SIGNIFICANT CHANGE UP (ref 150–400)
POTASSIUM SERPL-MCNC: 3.8 MMOL/L — SIGNIFICANT CHANGE UP (ref 3.5–5.3)
POTASSIUM SERPL-SCNC: 3.8 MMOL/L — SIGNIFICANT CHANGE UP (ref 3.5–5.3)
RBC # BLD: 3.21 M/UL — LOW (ref 3.8–5.2)
RBC # FLD: 13.4 % — SIGNIFICANT CHANGE UP (ref 10.3–14.5)
SODIUM SERPL-SCNC: 145 MMOL/L — SIGNIFICANT CHANGE UP (ref 135–145)
WBC # BLD: 11.06 K/UL — HIGH (ref 3.8–10.5)
WBC # FLD AUTO: 11.06 K/UL — HIGH (ref 3.8–10.5)

## 2020-04-01 PROCEDURE — 99232 SBSQ HOSP IP/OBS MODERATE 35: CPT | Mod: GC

## 2020-04-01 RX ADMIN — MEMANTINE HYDROCHLORIDE 10 MILLIGRAM(S): 10 TABLET ORAL at 17:43

## 2020-04-01 RX ADMIN — MEMANTINE HYDROCHLORIDE 10 MILLIGRAM(S): 10 TABLET ORAL at 05:32

## 2020-04-01 RX ADMIN — Medication 100 MILLIGRAM(S): at 13:31

## 2020-04-01 RX ADMIN — Medication 25 MILLIGRAM(S): at 05:33

## 2020-04-01 RX ADMIN — ENOXAPARIN SODIUM 40 MILLIGRAM(S): 100 INJECTION SUBCUTANEOUS at 13:31

## 2020-04-01 RX ADMIN — PANTOPRAZOLE SODIUM 40 MILLIGRAM(S): 20 TABLET, DELAYED RELEASE ORAL at 05:32

## 2020-04-01 RX ADMIN — ATORVASTATIN CALCIUM 40 MILLIGRAM(S): 80 TABLET, FILM COATED ORAL at 21:55

## 2020-04-01 RX ADMIN — AMLODIPINE BESYLATE 10 MILLIGRAM(S): 2.5 TABLET ORAL at 05:33

## 2020-04-01 RX ADMIN — Medication 25 MILLIGRAM(S): at 21:54

## 2020-04-01 RX ADMIN — Medication 25 MILLIGRAM(S): at 13:31

## 2020-04-01 RX ADMIN — Medication 10 MILLIGRAM(S): at 17:43

## 2020-04-01 RX ADMIN — Medication 10 MILLIGRAM(S): at 05:33

## 2020-04-01 RX ADMIN — Medication 1: at 08:41

## 2020-04-01 NOTE — PROGRESS NOTE ADULT - PROBLEM SELECTOR PLAN 3
creatinine 1.6 on admission - baseline unknown  Creatinine, Serum: 1Creatinine, Serum: 1.80 mg/dL (04.01.20 @ 05:45)   little better today.  trended up , will encourage hydration    avoid nephrotoxins  gentle hydration  hold home med lasix  bmp daily

## 2020-04-01 NOTE — PROGRESS NOTE ADULT - SUBJECTIVE AND OBJECTIVE BOX
PGY 1 Note discussed with supervising resident and primary attending    Patient is a 89y old  Female who presents with a chief complaint of acute hypoxic respiratory failure (31 Mar 2020 11:10)      INTERVAL HPI/OVERNIGHT EVENTS: Patient was seen and examined at bed side, no new complains noted , was more comfortable tahn yesterday.  satyrating 98% on NRB b, will try to wean off to % L.  tmax is 98.3.    , MEDICATIONS  (STANDING):  amLODIPine   Tablet 10 milliGRAM(s) Oral daily  atorvastatin 40 milliGRAM(s) Oral at bedtime  dextrose 5%. 1000 milliLiter(s) (50 mL/Hr) IV Continuous <Continuous>  enoxaparin Injectable 40 milliGRAM(s) SubCutaneous daily  hydrALAZINE 25 milliGRAM(s) Oral every 8 hours  insulin lispro (HumaLOG) corrective regimen sliding scale   SubCutaneous three times a day before meals  insulin lispro (HumaLOG) corrective regimen sliding scale   SubCutaneous at bedtime  memantine 10 milliGRAM(s) Oral two times a day  metoprolol succinate  milliGRAM(s) Oral daily  oxybutynin 10 milliGRAM(s) Oral two times a day  pantoprazole    Tablet 40 milliGRAM(s) Oral before breakfast    MEDICATIONS  (PRN):  acetaminophen   Tablet .. 650 milliGRAM(s) Oral every 6 hours PRN Temp greater or equal to 38C (100.4F)  dextrose 40% Gel 15 Gram(s) Oral once PRN Blood Glucose LESS THAN 70 milliGRAM(s)/deciliter  guaiFENesin   Syrup  (Sugar-Free) 200 milliGRAM(s) Oral every 6 hours PRN Cough      __________________________________________________  REVIEW OF SYSTEMS:    unable to do because the patient is confused .     Vital Signs Last 24 Hrs  T(C): 36.4 (01 Apr 2020 10:35), Max: 36.8 (01 Apr 2020 01:39)  T(F): 97.5 (01 Apr 2020 10:35), Max: 98.3 (01 Apr 2020 01:39)  HR: 74 (01 Apr 2020 10:35) (50 - 75)  BP: 117/42 (01 Apr 2020 10:35) (117/42 - 154/50)  BP(mean): --  RR: 18 (01 Apr 2020 10:35) (18 - 22)  SpO2: 100% (01 Apr 2020 10:35) (98% - 100%)    ________________________________________________  PHYSICAL EXAM:  GENERAL: elderly female, breathing comfortable on NRB  CHEST/LUNG: decreased air entry with Crackles heard B/l.  HEART: S1 S2  regular;   ABDOMEN: Soft, Nontender, Nondistended; Bowel sounds present  NERVOUS SYSTEM:  Awake and alert; No new deficits.  _________________________________________________  LABS:                        10.1   11.06 )-----------( 337      ( 01 Apr 2020 05:45 )             32.5     04-01    145  |  113<H>  |  51<H>  ----------------------------<  144<H>  3.8   |  28  |  1.80<H>    Ca    9.0      01 Apr 2020 05:45  Phos  3.9     03-31  Mg     3.2     03-31    TPro  7.6  /  Alb  2.2<L>  /  TBili  0.4  /  DBili  x   /  AST  67<H>  /  ALT  44  /  AlkPhos  92  03-31        CAPILLARY BLOOD GLUCOSE      POCT Blood Glucose.: 133 mg/dL (01 Apr 2020 11:25)  POCT Blood Glucose.: 152 mg/dL (01 Apr 2020 08:01)  POCT Blood Glucose.: 135 mg/dL (31 Mar 2020 22:11)  POCT Blood Glucose.: 135 mg/dL (31 Mar 2020 16:48)        RADIOLOGY & ADDITIONAL TESTS:    Imaging Personally Reviewed:  YES/NO    Consultant(s) Notes Reviewed:   YES/ No    Care Discussed with Consultants :     Plan of care was discussed with patient and /or primary care giver; all questions and concerns were addressed and care was aligned with patient's wishes.

## 2020-04-01 NOTE — PROGRESS NOTE ADULT - ASSESSMENT
HPI:  89 y/o female from [facility] with PMHx of HTN, DM, dementia (AAOx1-2) and HLD is sent to the ED for shortness of breath. Patient is a poor historian and denies any complaints. She denies cough or fever and reports feeling well at present. ABG confirms hypoxemia with pO2 68 on admission. Patient currently saturating at 97% on 4L NC. She is in no acute distress.

## 2020-04-02 LAB
ALBUMIN SERPL ELPH-MCNC: 2.3 G/DL — LOW (ref 3.5–5)
ALP SERPL-CCNC: 99 U/L — SIGNIFICANT CHANGE UP (ref 40–120)
ALT FLD-CCNC: 41 U/L DA — SIGNIFICANT CHANGE UP (ref 10–60)
ANION GAP SERPL CALC-SCNC: 6 MMOL/L — SIGNIFICANT CHANGE UP (ref 5–17)
AST SERPL-CCNC: 45 U/L — HIGH (ref 10–40)
BILIRUB SERPL-MCNC: 0.5 MG/DL — SIGNIFICANT CHANGE UP (ref 0.2–1.2)
BUN SERPL-MCNC: 45 MG/DL — HIGH (ref 7–18)
CALCIUM SERPL-MCNC: 9.2 MG/DL — SIGNIFICANT CHANGE UP (ref 8.4–10.5)
CHLORIDE SERPL-SCNC: 115 MMOL/L — HIGH (ref 96–108)
CO2 SERPL-SCNC: 27 MMOL/L — SIGNIFICANT CHANGE UP (ref 22–31)
CREAT SERPL-MCNC: 1.44 MG/DL — HIGH (ref 0.5–1.3)
GLUCOSE BLDC GLUCOMTR-MCNC: 119 MG/DL — HIGH (ref 70–99)
GLUCOSE BLDC GLUCOMTR-MCNC: 129 MG/DL — HIGH (ref 70–99)
GLUCOSE BLDC GLUCOMTR-MCNC: 166 MG/DL — HIGH (ref 70–99)
GLUCOSE BLDC GLUCOMTR-MCNC: 89 MG/DL — SIGNIFICANT CHANGE UP (ref 70–99)
GLUCOSE SERPL-MCNC: 104 MG/DL — HIGH (ref 70–99)
HCT VFR BLD CALC: 34.2 % — LOW (ref 34.5–45)
HGB BLD-MCNC: 10.6 G/DL — LOW (ref 11.5–15.5)
MAGNESIUM SERPL-MCNC: 3.1 MG/DL — HIGH (ref 1.6–2.6)
MCHC RBC-ENTMCNC: 31 GM/DL — LOW (ref 32–36)
MCHC RBC-ENTMCNC: 31.5 PG — SIGNIFICANT CHANGE UP (ref 27–34)
MCV RBC AUTO: 101.5 FL — HIGH (ref 80–100)
NRBC # BLD: 0 /100 WBCS — SIGNIFICANT CHANGE UP (ref 0–0)
PHOSPHATE SERPL-MCNC: 2.4 MG/DL — LOW (ref 2.5–4.5)
PLATELET # BLD AUTO: 365 K/UL — SIGNIFICANT CHANGE UP (ref 150–400)
POTASSIUM SERPL-MCNC: 3.6 MMOL/L — SIGNIFICANT CHANGE UP (ref 3.5–5.3)
POTASSIUM SERPL-SCNC: 3.6 MMOL/L — SIGNIFICANT CHANGE UP (ref 3.5–5.3)
PROT SERPL-MCNC: 7.9 G/DL — SIGNIFICANT CHANGE UP (ref 6–8.3)
RBC # BLD: 3.37 M/UL — LOW (ref 3.8–5.2)
RBC # FLD: 13.4 % — SIGNIFICANT CHANGE UP (ref 10.3–14.5)
SODIUM SERPL-SCNC: 148 MMOL/L — HIGH (ref 135–145)
WBC # BLD: 12.68 K/UL — HIGH (ref 3.8–10.5)
WBC # FLD AUTO: 12.68 K/UL — HIGH (ref 3.8–10.5)

## 2020-04-02 PROCEDURE — 99232 SBSQ HOSP IP/OBS MODERATE 35: CPT | Mod: GC

## 2020-04-02 RX ORDER — POTASSIUM PHOSPHATE, MONOBASIC POTASSIUM PHOSPHATE, DIBASIC 236; 224 MG/ML; MG/ML
15 INJECTION, SOLUTION INTRAVENOUS ONCE
Refills: 0 | Status: COMPLETED | OUTPATIENT
Start: 2020-04-02 | End: 2020-04-02

## 2020-04-02 RX ADMIN — Medication 100 MILLIGRAM(S): at 06:09

## 2020-04-02 RX ADMIN — POTASSIUM PHOSPHATE, MONOBASIC POTASSIUM PHOSPHATE, DIBASIC 62.5 MILLIMOLE(S): 236; 224 INJECTION, SOLUTION INTRAVENOUS at 17:20

## 2020-04-02 RX ADMIN — MEMANTINE HYDROCHLORIDE 10 MILLIGRAM(S): 10 TABLET ORAL at 06:09

## 2020-04-02 RX ADMIN — PANTOPRAZOLE SODIUM 40 MILLIGRAM(S): 20 TABLET, DELAYED RELEASE ORAL at 06:09

## 2020-04-02 RX ADMIN — ENOXAPARIN SODIUM 40 MILLIGRAM(S): 100 INJECTION SUBCUTANEOUS at 12:08

## 2020-04-02 RX ADMIN — Medication 25 MILLIGRAM(S): at 06:09

## 2020-04-02 RX ADMIN — Medication 1: at 17:20

## 2020-04-02 RX ADMIN — Medication 10 MILLIGRAM(S): at 17:19

## 2020-04-02 RX ADMIN — Medication 25 MILLIGRAM(S): at 22:50

## 2020-04-02 RX ADMIN — ATORVASTATIN CALCIUM 40 MILLIGRAM(S): 80 TABLET, FILM COATED ORAL at 22:50

## 2020-04-02 RX ADMIN — MEMANTINE HYDROCHLORIDE 10 MILLIGRAM(S): 10 TABLET ORAL at 17:20

## 2020-04-02 RX ADMIN — Medication 25 MILLIGRAM(S): at 12:08

## 2020-04-02 RX ADMIN — Medication 10 MILLIGRAM(S): at 06:09

## 2020-04-02 RX ADMIN — AMLODIPINE BESYLATE 10 MILLIGRAM(S): 2.5 TABLET ORAL at 06:09

## 2020-04-02 NOTE — PROGRESS NOTE ADULT - ASSESSMENT
HPI:  88 y/o female from [facility] with PMHx of HTN, DM, dementia (AAOx1-2) and HLD is sent to the ED for shortness of breath. Patient is a poor historian and denies any complaints. She denies cough or fever and reports feeling well at present. ABG confirms hypoxemia with pO2 68 on admission. Patient currently saturating at 97% on 4L NC. She is in no acute distress.

## 2020-04-02 NOTE — PROGRESS NOTE ADULT - SUBJECTIVE AND OBJECTIVE BOX
PGY 1 Note discussed with supervising resident and primary attending    Patient is a 89y old  Female who presents with a chief complaint of acute hypoxic respiratory failure (01 Apr 2020 11:39)      INTERVAL HPI/OVERNIGHT EVENTS: Patient was seen and examined at bed side, her condition is improving , saturating 92% on 5l nasal cannula, not in respiratory distress.   -afebrile    MEDICATIONS  (STANDING):  amLODIPine   Tablet 10 milliGRAM(s) Oral daily  atorvastatin 40 milliGRAM(s) Oral at bedtime  dextrose 5%. 1000 milliLiter(s) (50 mL/Hr) IV Continuous <Continuous>  enoxaparin Injectable 40 milliGRAM(s) SubCutaneous daily  hydrALAZINE 25 milliGRAM(s) Oral every 8 hours  insulin lispro (HumaLOG) corrective regimen sliding scale   SubCutaneous three times a day before meals  insulin lispro (HumaLOG) corrective regimen sliding scale   SubCutaneous at bedtime  memantine 10 milliGRAM(s) Oral two times a day  metoprolol succinate  milliGRAM(s) Oral daily  oxybutynin 10 milliGRAM(s) Oral two times a day  pantoprazole    Tablet 40 milliGRAM(s) Oral before breakfast    MEDICATIONS  (PRN):  acetaminophen   Tablet .. 650 milliGRAM(s) Oral every 6 hours PRN Temp greater or equal to 38C (100.4F)  dextrose 40% Gel 15 Gram(s) Oral once PRN Blood Glucose LESS THAN 70 milliGRAM(s)/deciliter  guaiFENesin   Syrup  (Sugar-Free) 200 milliGRAM(s) Oral every 6 hours PRN Cough      __________________________________________________  REVIEW OF SYSTEMS:    un able to do because of patient is confused, does not respond appropriately      Vital Signs Last 24 Hrs  T(C): 36.4 (02 Apr 2020 10:33), Max: 36.7 (01 Apr 2020 14:27)  T(F): 97.5 (02 Apr 2020 10:33), Max: 98 (01 Apr 2020 14:27)  HR: 91 (02 Apr 2020 10:33) (63 - 91)  BP: 155/55 (02 Apr 2020 10:33) (155/50 - 173/53)  BP(mean): 101 (01 Apr 2020 13:29) (101 - 101)  RR: 18 (02 Apr 2020 10:33) (16 - 20)  SpO2: 96% (02 Apr 2020 10:33) (91% - 98%)    ________________________________________________  PHYSICAL EXAM:  GENERAL: Elderly female , more comfortable today  HEENT: Normocephalic;  conjunctivae and sclerae clear; moist mucous membranes;   CHEST/LUNG: decreased air entry B/L  HEART: S1 S2  regular;   ABDOMEN: Soft, Nontender, Nondistended; Bowel sounds present  NERVOUS SYSTEM:  Awake and alert;  ; no new deficits    _________________________________________________  LABS:                        10.6   12.68 )-----------( 365      ( 02 Apr 2020 05:43 )             34.2     04-02    148<H>  |  115<H>  |  45<H>  ----------------------------<  104<H>  3.6   |  27  |  1.44<H>    Ca    9.2      02 Apr 2020 05:43  Phos  2.4     04-02  Mg     3.1     04-02    TPro  7.9  /  Alb  2.3<L>  /  TBili  0.5  /  DBili  x   /  AST  45<H>  /  ALT  41  /  AlkPhos  99  04-02        CAPILLARY BLOOD GLUCOSE      POCT Blood Glucose.: 129 mg/dL (02 Apr 2020 08:33)  POCT Blood Glucose.: 119 mg/dL (01 Apr 2020 21:39)  POCT Blood Glucose.: 132 mg/dL (01 Apr 2020 16:35)  POCT Blood Glucose.: 133 mg/dL (01 Apr 2020 11:25)        RADIOLOGY & ADDITIONAL TESTS:    Imaging Personally Reviewed:  YES/NO    Consultant(s) Notes Reviewed:   YES/ No    Care Discussed with Consultants :     Plan of care was discussed with patient and /or primary care giver; all questions and concerns were addressed and care was aligned with patient's wishes.

## 2020-04-03 LAB
GLUCOSE BLDC GLUCOMTR-MCNC: 100 MG/DL — HIGH (ref 70–99)
GLUCOSE BLDC GLUCOMTR-MCNC: 100 MG/DL — HIGH (ref 70–99)
GLUCOSE BLDC GLUCOMTR-MCNC: 116 MG/DL — HIGH (ref 70–99)
GLUCOSE BLDC GLUCOMTR-MCNC: 137 MG/DL — HIGH (ref 70–99)

## 2020-04-03 PROCEDURE — 99232 SBSQ HOSP IP/OBS MODERATE 35: CPT | Mod: GC

## 2020-04-03 RX ORDER — HYDRALAZINE HCL 50 MG
25 TABLET ORAL ONCE
Refills: 0 | Status: COMPLETED | OUTPATIENT
Start: 2020-04-03 | End: 2020-04-03

## 2020-04-03 RX ADMIN — Medication 25 MILLIGRAM(S): at 18:07

## 2020-04-03 RX ADMIN — AMLODIPINE BESYLATE 10 MILLIGRAM(S): 2.5 TABLET ORAL at 05:35

## 2020-04-03 RX ADMIN — Medication 100 MILLIGRAM(S): at 05:36

## 2020-04-03 RX ADMIN — Medication 25 MILLIGRAM(S): at 16:07

## 2020-04-03 RX ADMIN — Medication 10 MILLIGRAM(S): at 05:36

## 2020-04-03 RX ADMIN — Medication 10 MILLIGRAM(S): at 16:09

## 2020-04-03 RX ADMIN — ENOXAPARIN SODIUM 40 MILLIGRAM(S): 100 INJECTION SUBCUTANEOUS at 10:20

## 2020-04-03 RX ADMIN — Medication 25 MILLIGRAM(S): at 05:35

## 2020-04-03 RX ADMIN — MEMANTINE HYDROCHLORIDE 10 MILLIGRAM(S): 10 TABLET ORAL at 16:09

## 2020-04-03 RX ADMIN — PANTOPRAZOLE SODIUM 40 MILLIGRAM(S): 20 TABLET, DELAYED RELEASE ORAL at 05:36

## 2020-04-03 RX ADMIN — MEMANTINE HYDROCHLORIDE 10 MILLIGRAM(S): 10 TABLET ORAL at 05:35

## 2020-04-03 NOTE — PROGRESS NOTE ADULT - PROBLEM SELECTOR PLAN 1
Patient was placed on non  rebreather because she was desaturating to 80s on nasal canula.  -patient on nasal cannula on 5 l.  COVID +ve.  -amipiric antibiotics completed

## 2020-04-03 NOTE — PROGRESS NOTE ADULT - SUBJECTIVE AND OBJECTIVE BOX
PGY 1 Note discussed with supervising resident and primary attending    Patient is a 89y old  Female who presents with a chief complaint of acute hypoxic respiratory failure (02 Apr 2020 11:12)      INTERVAL HPI/OVERNIGHT EVENTS:  Patient was seen and examined at bed side, saturating 94% on 4 l nasal cannula.    MEDICATIONS  (STANDING):  amLODIPine   Tablet 10 milliGRAM(s) Oral daily  atorvastatin 40 milliGRAM(s) Oral at bedtime  dextrose 5%. 1000 milliLiter(s) (50 mL/Hr) IV Continuous <Continuous>  enoxaparin Injectable 40 milliGRAM(s) SubCutaneous daily  hydrALAZINE 25 milliGRAM(s) Oral every 8 hours  insulin lispro (HumaLOG) corrective regimen sliding scale   SubCutaneous three times a day before meals  insulin lispro (HumaLOG) corrective regimen sliding scale   SubCutaneous at bedtime  memantine 10 milliGRAM(s) Oral two times a day  metoprolol succinate  milliGRAM(s) Oral daily  oxybutynin 10 milliGRAM(s) Oral two times a day  pantoprazole    Tablet 40 milliGRAM(s) Oral before breakfast    MEDICATIONS  (PRN):  acetaminophen   Tablet .. 650 milliGRAM(s) Oral every 6 hours PRN Temp greater or equal to 38C (100.4F)  dextrose 40% Gel 15 Gram(s) Oral once PRN Blood Glucose LESS THAN 70 milliGRAM(s)/deciliter  guaiFENesin   Syrup  (Sugar-Free) 200 milliGRAM(s) Oral every 6 hours PRN Cough      __________________________________________________  REVIEW OF SYSTEMS:    unable to do because of patients confusion.    Vital Signs Last 24 Hrs  T(C): 36.3 (03 Apr 2020 11:28), Max: 37 (02 Apr 2020 21:40)  T(F): 97.3 (03 Apr 2020 11:28), Max: 98.6 (02 Apr 2020 21:40)  HR: 77 (03 Apr 2020 11:28) (58 - 77)  BP: 142/78 (03 Apr 2020 11:28) (142/78 - 175/53)  BP(mean): --  RR: 18 (03 Apr 2020 11:28) (17 - 18)  SpO2: 94% (03 Apr 2020 11:28) (94% - 99%)    ________________________________________________  PHYSICAL EXAM:  GENERAL: elderly female comfortable on bed  HEENT: Normocephalic;  conjunctivae and sclerae clear;    CHEST/LUNG: improved breath sounds  HEART: S1 S2  regular; no murmurs, gallops or rubs  ABDOMEN: Soft, Nontender, Nondistended; Bowel sounds present  EXTREMITIES: no cyanosis; no edema; no calf tenderness  NERVOUS SYSTEM:  Awake and alert; ; no new deficits    _________________________________________________  LABS:                        10.6   12.68 )-----------( 365      ( 02 Apr 2020 05:43 )             34.2     04-02    148<H>  |  115<H>  |  45<H>  ----------------------------<  104<H>  3.6   |  27  |  1.44<H>    Ca    9.2      02 Apr 2020 05:43  Phos  2.4     04-02  Mg     3.1     04-02    TPro  7.9  /  Alb  2.3<L>  /  TBili  0.5  /  DBili  x   /  AST  45<H>  /  ALT  41  /  AlkPhos  99  04-02        CAPILLARY BLOOD GLUCOSE      POCT Blood Glucose.: 116 mg/dL (03 Apr 2020 11:59)  POCT Blood Glucose.: 100 mg/dL (03 Apr 2020 08:04)  POCT Blood Glucose.: 89 mg/dL (02 Apr 2020 22:32)  POCT Blood Glucose.: 166 mg/dL (02 Apr 2020 16:38)        RADIOLOGY & ADDITIONAL TESTS:    Imaging Personally Reviewed:  YES/NO    Consultant(s) Notes Reviewed:   YES/ No    Care Discussed with Consultants :     Plan of care was discussed with patient and /or primary care giver; all questions and concerns were addressed and care was aligned with patient's wishes.

## 2020-04-03 NOTE — PROGRESS NOTE ADULT - PROBLEM SELECTOR PLAN 3
creatinine 1.6 on admission - baseline unknown  Creatinine, Serum: 1.44 mg/dL (04.02.20 @ 05:43)    trended up , will encourage hydration    avoid nephrotoxins  gentle hydration  hold home med lasix  bmp daily

## 2020-04-03 NOTE — PROGRESS NOTE ADULT - ASSESSMENT
HPI:  89 y/o female from [facility] with PMHx of HTN, DM, dementia (AAOx1-2) and HLD is sent to the ED for shortness of breath. Patient is a poor historian and denies any complaints. She denies cough or fever and reports feeling well at present. ABG confirms hypoxemia with pO2 68 on admission. Patient currently saturating at 97% on 4L NC. She is in no acute distress.

## 2020-04-04 LAB
ALBUMIN SERPL ELPH-MCNC: 2.2 G/DL — LOW (ref 3.5–5)
ALP SERPL-CCNC: 105 U/L — SIGNIFICANT CHANGE UP (ref 40–120)
ALT FLD-CCNC: 36 U/L DA — SIGNIFICANT CHANGE UP (ref 10–60)
ANION GAP SERPL CALC-SCNC: 8 MMOL/L — SIGNIFICANT CHANGE UP (ref 5–17)
AST SERPL-CCNC: 36 U/L — SIGNIFICANT CHANGE UP (ref 10–40)
BILIRUB SERPL-MCNC: 0.4 MG/DL — SIGNIFICANT CHANGE UP (ref 0.2–1.2)
BUN SERPL-MCNC: 32 MG/DL — HIGH (ref 7–18)
CALCIUM SERPL-MCNC: 8.8 MG/DL — SIGNIFICANT CHANGE UP (ref 8.4–10.5)
CHLORIDE SERPL-SCNC: 117 MMOL/L — HIGH (ref 96–108)
CO2 SERPL-SCNC: 24 MMOL/L — SIGNIFICANT CHANGE UP (ref 22–31)
CREAT SERPL-MCNC: 1.1 MG/DL — SIGNIFICANT CHANGE UP (ref 0.5–1.3)
GLUCOSE BLDC GLUCOMTR-MCNC: 101 MG/DL — HIGH (ref 70–99)
GLUCOSE BLDC GLUCOMTR-MCNC: 132 MG/DL — HIGH (ref 70–99)
GLUCOSE BLDC GLUCOMTR-MCNC: 154 MG/DL — HIGH (ref 70–99)
GLUCOSE BLDC GLUCOMTR-MCNC: 164 MG/DL — HIGH (ref 70–99)
GLUCOSE SERPL-MCNC: 102 MG/DL — HIGH (ref 70–99)
HCT VFR BLD CALC: 37.6 % — SIGNIFICANT CHANGE UP (ref 34.5–45)
HGB BLD-MCNC: 11.6 G/DL — SIGNIFICANT CHANGE UP (ref 11.5–15.5)
MAGNESIUM SERPL-MCNC: 2.7 MG/DL — HIGH (ref 1.6–2.6)
MCHC RBC-ENTMCNC: 30.9 GM/DL — LOW (ref 32–36)
MCHC RBC-ENTMCNC: 30.9 PG — SIGNIFICANT CHANGE UP (ref 27–34)
MCV RBC AUTO: 100 FL — SIGNIFICANT CHANGE UP (ref 80–100)
NRBC # BLD: 0 /100 WBCS — SIGNIFICANT CHANGE UP (ref 0–0)
PHOSPHATE SERPL-MCNC: 2.8 MG/DL — SIGNIFICANT CHANGE UP (ref 2.5–4.5)
PLATELET # BLD AUTO: 351 K/UL — SIGNIFICANT CHANGE UP (ref 150–400)
POTASSIUM SERPL-MCNC: 4.2 MMOL/L — SIGNIFICANT CHANGE UP (ref 3.5–5.3)
POTASSIUM SERPL-SCNC: 4.2 MMOL/L — SIGNIFICANT CHANGE UP (ref 3.5–5.3)
PROT SERPL-MCNC: 7.6 G/DL — SIGNIFICANT CHANGE UP (ref 6–8.3)
RBC # BLD: 3.76 M/UL — LOW (ref 3.8–5.2)
RBC # FLD: 13.4 % — SIGNIFICANT CHANGE UP (ref 10.3–14.5)
SODIUM SERPL-SCNC: 149 MMOL/L — HIGH (ref 135–145)
WBC # BLD: 12.84 K/UL — HIGH (ref 3.8–10.5)
WBC # FLD AUTO: 12.84 K/UL — HIGH (ref 3.8–10.5)

## 2020-04-04 PROCEDURE — 99232 SBSQ HOSP IP/OBS MODERATE 35: CPT | Mod: CS

## 2020-04-04 RX ADMIN — Medication 1: at 12:43

## 2020-04-04 RX ADMIN — Medication 25 MILLIGRAM(S): at 21:43

## 2020-04-04 RX ADMIN — PANTOPRAZOLE SODIUM 40 MILLIGRAM(S): 20 TABLET, DELAYED RELEASE ORAL at 06:43

## 2020-04-04 RX ADMIN — MEMANTINE HYDROCHLORIDE 10 MILLIGRAM(S): 10 TABLET ORAL at 17:44

## 2020-04-04 RX ADMIN — Medication 25 MILLIGRAM(S): at 06:43

## 2020-04-04 RX ADMIN — Medication 10 MILLIGRAM(S): at 06:43

## 2020-04-04 RX ADMIN — ATORVASTATIN CALCIUM 40 MILLIGRAM(S): 80 TABLET, FILM COATED ORAL at 21:43

## 2020-04-04 RX ADMIN — MEMANTINE HYDROCHLORIDE 10 MILLIGRAM(S): 10 TABLET ORAL at 06:43

## 2020-04-04 RX ADMIN — Medication 10 MILLIGRAM(S): at 17:44

## 2020-04-04 RX ADMIN — Medication 100 MILLIGRAM(S): at 06:43

## 2020-04-04 RX ADMIN — Medication 25 MILLIGRAM(S): at 14:06

## 2020-04-04 RX ADMIN — ENOXAPARIN SODIUM 40 MILLIGRAM(S): 100 INJECTION SUBCUTANEOUS at 14:06

## 2020-04-04 RX ADMIN — AMLODIPINE BESYLATE 10 MILLIGRAM(S): 2.5 TABLET ORAL at 06:43

## 2020-04-04 NOTE — PROGRESS NOTE ADULT - SUBJECTIVE AND OBJECTIVE BOX
MEDICAL ATTENDING NOTE   Patient is a 89y old  Female who presents with a chief complaint of acute hypoxic respiratory failure (02 Apr 2020 11:12)  INTERVAL HPI: no new complaints   Patient was seen and examined at bed side, saturating 91-98% on 5 l nasal cannula.    MEDICATIONS  (STANDING):  amLODIPine   Tablet 10 milliGRAM(s) Oral daily  atorvastatin 40 milliGRAM(s) Oral at bedtime  dextrose 5%. 1000 milliLiter(s) (50 mL/Hr) IV Continuous <Continuous>  enoxaparin Injectable 40 milliGRAM(s) SubCutaneous daily  hydrALAZINE 25 milliGRAM(s) Oral every 8 hours  insulin lispro (HumaLOG) corrective regimen sliding scale   SubCutaneous three times a day before meals  insulin lispro (HumaLOG) corrective regimen sliding scale   SubCutaneous at bedtime  memantine 10 milliGRAM(s) Oral two times a day  metoprolol succinate  milliGRAM(s) Oral daily  oxybutynin 10 milliGRAM(s) Oral two times a day  pantoprazole    Tablet 40 milliGRAM(s) Oral before breakfast    MEDICATIONS  (PRN):  acetaminophen   Tablet .. 650 milliGRAM(s) Oral every 6 hours PRN Temp greater or equal to 38C (100.4F)  dextrose 40% Gel 15 Gram(s) Oral once PRN Blood Glucose LESS THAN 70 milliGRAM(s)/deciliter  guaiFENesin   Syrup  (Sugar-Free) 200 milliGRAM(s) Oral every 6 hours PRN Cough      Vital Signs Last 24 Hrs  T(C): 36.4 (04 Apr 2020 14:41), Max: 36.7 (04 Apr 2020 02:10)  T(F): 97.5 (04 Apr 2020 14:41), Max: 98 (04 Apr 2020 02:10)  HR: 64 (04 Apr 2020 14:41) (64 - 76)  BP: 150/51 (04 Apr 2020 14:41) (141/77 - 175/66)  BP(mean): --  RR: 18 (04 Apr 2020 14:41) (17 - 18)  SpO2: 98% (04 Apr 2020 14:41) (91% - 98%)    REVIEW OF SYSTEMS:    unable to do because of patients confusion.  _________________  PHYSICAL EXAM:  ---------------------------   NAD; Normocephalic;   LUNGS - no wheezing; decreased bilateral air entry  HEART: S1 S2+   ABDOMEN: Soft, Nontender, non distended  EXTREMITIES: no cyanosis; no edema  NERVOUS SYSTEM:  Sleepy but awakens and becomes alert; no focal neuro  deficits    _________________________________________________  LABS:                        11.6   12.84 )-----------( 351      ( 04 Apr 2020 06:02 )             37.6     04-04    149<H>  |  117<H>  |  32<H>  ----------------------------<  102<H>  4.2   |  24  |  1.10    Ca    8.8      04 Apr 2020 06:02  Phos  2.8     04-04  Mg     2.7     04-04    TPro  7.6  /  Alb  2.2<L>  /  TBili  0.4  /  DBili  x   /  AST  36  /  ALT  36  /  AlkPhos  105  04-04      CAPILLARY BLOOD GLUCOSE      POCT Blood Glucose.: 154 mg/dL (04 Apr 2020 11:49)  POCT Blood Glucose.: 101 mg/dL (04 Apr 2020 08:13)  POCT Blood Glucose.: 137 mg/dL (03 Apr 2020 22:29)  POCT Blood Glucose.: 100 mg/dL (03 Apr 2020 16:41)          Plan of care was discussed with patient ; all questions and concerns were addressed and care was aligned with patient's wishes.

## 2020-04-04 NOTE — PROGRESS NOTE ADULT - ASSESSMENT
Impression /Plan:   1. Pneumonia due to COVID-19 with hypoxia  2. Hypoalbuminemia  3. Improving MARIA E serum creatinine 1.1    - Oxygen supplementation as needed; keep saturation >92%  - nutritional supplements encouraged  -  monitor LFTs  gentle hydration  hold home med lasix  bmp daily.   - Encourage prone positioning 10-15 mins /hr as tolerated  - consider steroids as clinically expedient   - Isolation precautions for COVID-19 per infection control protocol  - Low threshold for ICU consult  - Prognosis guarded  -HTN continue home meds stable  -DM (diabetes mellitus) monitoring blood glucose

## 2020-04-05 LAB
GLUCOSE BLDC GLUCOMTR-MCNC: 126 MG/DL — HIGH (ref 70–99)
GLUCOSE BLDC GLUCOMTR-MCNC: 83 MG/DL — SIGNIFICANT CHANGE UP (ref 70–99)
GLUCOSE BLDC GLUCOMTR-MCNC: 94 MG/DL — SIGNIFICANT CHANGE UP (ref 70–99)
GLUCOSE BLDC GLUCOMTR-MCNC: 96 MG/DL — SIGNIFICANT CHANGE UP (ref 70–99)

## 2020-04-05 PROCEDURE — 99232 SBSQ HOSP IP/OBS MODERATE 35: CPT | Mod: GC

## 2020-04-05 RX ORDER — HYDRALAZINE HCL 50 MG
10 TABLET ORAL ONCE
Refills: 0 | Status: COMPLETED | OUTPATIENT
Start: 2020-04-05 | End: 2020-04-05

## 2020-04-05 RX ADMIN — Medication 25 MILLIGRAM(S): at 12:45

## 2020-04-05 RX ADMIN — Medication 10 MILLIGRAM(S): at 16:26

## 2020-04-05 RX ADMIN — MEMANTINE HYDROCHLORIDE 10 MILLIGRAM(S): 10 TABLET ORAL at 05:47

## 2020-04-05 RX ADMIN — ATORVASTATIN CALCIUM 40 MILLIGRAM(S): 80 TABLET, FILM COATED ORAL at 22:01

## 2020-04-05 RX ADMIN — ENOXAPARIN SODIUM 40 MILLIGRAM(S): 100 INJECTION SUBCUTANEOUS at 12:45

## 2020-04-05 RX ADMIN — Medication 25 MILLIGRAM(S): at 22:01

## 2020-04-05 RX ADMIN — Medication 10 MILLIGRAM(S): at 05:47

## 2020-04-05 RX ADMIN — Medication 10 MILLIGRAM(S): at 18:02

## 2020-04-05 RX ADMIN — Medication 25 MILLIGRAM(S): at 05:47

## 2020-04-05 RX ADMIN — MEMANTINE HYDROCHLORIDE 10 MILLIGRAM(S): 10 TABLET ORAL at 16:26

## 2020-04-05 RX ADMIN — AMLODIPINE BESYLATE 10 MILLIGRAM(S): 2.5 TABLET ORAL at 05:47

## 2020-04-05 NOTE — PROGRESS NOTE ADULT - ASSESSMENT
Assessment /Plan:   1. Pneumonia due to COVID-19 with hypoxia  2. Hypoalbuminemia  3. Abnormal LFTs due to acute viral illness    - Continue Oxygen supplementation as needed; keep saturation >92%  - continue current medications   - Encourage nutritional supplements   - monitor labs routinely  including LFTs, ferritin   - Encourage prone positioning 10-15 mins /hr as tolerated  - consider steroids as clinically expedient   - Isolation precautions for COVID-19 per infection control protocol  - Low threshold for ICU consult  - Prognosis guarded        Plan of care was discussed  and aligned with patient's wishes. Assessment /Plan:   1. Pneumonia due to COVID-19 with hypoxia  2. Hypoalbuminemia  3. Abnormal LFTs due to acute viral illness  4. Essential HTN  5. Debility and deconditioning    - Continue Oxygen supplementation as needed; keep saturation >92%  - continue current medications   - Encourage nutritional supplements   - consider steroids as clinically expedient   - Isolation precautions for COVID-19 per infection control protocol  - Low threshold for ICU consult  - Prognosis guarded        Plan of care was discussed  and aligned with patient's wishes.

## 2020-04-05 NOTE — PROGRESS NOTE ADULT - SUBJECTIVE AND OBJECTIVE BOX
MEDICAL ATTENDING NOTE     INTERVAL HPI: no new complaints    ROS: no CP, SOB now improving    MEDICATIONS  (STANDING):  amLODIPine   Tablet 10 milliGRAM(s) Oral daily  atorvastatin 40 milliGRAM(s) Oral at bedtime  dextrose 5%. 1000 milliLiter(s) (50 mL/Hr) IV Continuous <Continuous>  enoxaparin Injectable 40 milliGRAM(s) SubCutaneous daily  hydrALAZINE 25 milliGRAM(s) Oral every 8 hours  insulin lispro (HumaLOG) corrective regimen sliding scale   SubCutaneous three times a day before meals  insulin lispro (HumaLOG) corrective regimen sliding scale   SubCutaneous at bedtime  memantine 10 milliGRAM(s) Oral two times a day  metoprolol succinate  milliGRAM(s) Oral daily  oxybutynin 10 milliGRAM(s) Oral two times a day  pantoprazole    Tablet 40 milliGRAM(s) Oral before breakfast    MEDICATIONS  (PRN):  acetaminophen   Tablet .. 650 milliGRAM(s) Oral every 6 hours PRN Temp greater or equal to 38C (100.4F)  dextrose 40% Gel 15 Gram(s) Oral once PRN Blood Glucose LESS THAN 70 milliGRAM(s)/deciliter  guaiFENesin   Syrup  (Sugar-Free) 200 milliGRAM(s) Oral every 6 hours PRN Cough      Vital Signs Last 24 Hrs  T(C): 36.8 (05 Apr 2020 17:15), Max: 36.8 (05 Apr 2020 17:15)  T(F): 98.2 (05 Apr 2020 17:15), Max: 98.2 (05 Apr 2020 17:15)  HR: 63 (05 Apr 2020 17:15) (61 - 73)  BP: 173/57 (05 Apr 2020 17:15) (110/74 - 173/57)  BP(mean): --  RR: 18 (05 Apr 2020 17:15) (17 - 18)  SpO2: 98% (05 Apr 2020 17:15) (95% - 99%)    _________________  PHYSICAL EXAM:  ---------------------------   NAD; Normocephalic;   LUNGS - no wheezing; decreased bilateral air entry  HEART: S1 S2+   ABDOMEN: Soft, Nontender, non distended  EXTREMITIES: no cyanosis; no edema  NERVOUS SYSTEM:  Awake and alert; no focal neuro  deficits    _________________________________________________  LABS:                        11.6   12.84 )-----------( 351      ( 04 Apr 2020 06:02 )             37.6     04-04    149<H>  |  117<H>  |  32<H>  ----------------------------<  102<H>  4.2   |  24  |  1.10    Ca    8.8      04 Apr 2020 06:02  Phos  2.8     04-04  Mg     2.7     04-04    TPro  7.6  /  Alb  2.2<L>  /  TBili  0.4  /  DBili  x   /  AST  36  /  ALT  36  /  AlkPhos  105  04-04      CAPILLARY BLOOD GLUCOSE      POCT Blood Glucose.: 83 mg/dL (05 Apr 2020 16:22)  POCT Blood Glucose.: 96 mg/dL (05 Apr 2020 11:25)  POCT Blood Glucose.: 94 mg/dL (05 Apr 2020 08:10)  POCT Blood Glucose.: 164 mg/dL (04 Apr 2020 21:39) MEDICAL ATTENDING NOTE     INTERVAL HPI: no new complaints    ROS: no CP, SOB slowly  improving    MEDICATIONS  (STANDING):  amLODIPine   Tablet 10 milliGRAM(s) Oral daily  atorvastatin 40 milliGRAM(s) Oral at bedtime  dextrose 5%. 1000 milliLiter(s) (50 mL/Hr) IV Continuous <Continuous>  enoxaparin Injectable 40 milliGRAM(s) SubCutaneous daily  hydrALAZINE 25 milliGRAM(s) Oral every 8 hours  insulin lispro (HumaLOG) corrective regimen sliding scale   SubCutaneous three times a day before meals  insulin lispro (HumaLOG) corrective regimen sliding scale   SubCutaneous at bedtime  memantine 10 milliGRAM(s) Oral two times a day  metoprolol succinate  milliGRAM(s) Oral daily  oxybutynin 10 milliGRAM(s) Oral two times a day  pantoprazole    Tablet 40 milliGRAM(s) Oral before breakfast    MEDICATIONS  (PRN):  acetaminophen   Tablet .. 650 milliGRAM(s) Oral every 6 hours PRN Temp greater or equal to 38C (100.4F)  dextrose 40% Gel 15 Gram(s) Oral once PRN Blood Glucose LESS THAN 70 milliGRAM(s)/deciliter  guaiFENesin   Syrup  (Sugar-Free) 200 milliGRAM(s) Oral every 6 hours PRN Cough      Vital Signs Last 24 Hrs  T(C): 36.8 (05 Apr 2020 17:15), Max: 36.8 (05 Apr 2020 17:15)  T(F): 98.2 (05 Apr 2020 17:15), Max: 98.2 (05 Apr 2020 17:15)  HR: 63 (05 Apr 2020 17:15) (61 - 73)  BP: 173/57 (05 Apr 2020 17:15) (110/74 - 173/57)  BP(mean): --  RR: 18 (05 Apr 2020 17:15) (17 - 18)  SpO2: 98% (05 Apr 2020 17:15) (95% - 99%)    _________________  PHYSICAL EXAM:  ---------------------------   NAD; Normocephalic;   LUNGS - no wheezing; decreased bilateral air entry  HEART: S1 S2+   ABDOMEN: Soft, Nontender, non distended  EXTREMITIES: no cyanosis; no edema  NERVOUS SYSTEM:  Awake and alert; no focal neuro  deficits    _________________________________________________  LABS:                        11.6   12.84 )-----------( 351      ( 04 Apr 2020 06:02 )             37.6     04-04    149<H>  |  117<H>  |  32<H>  ----------------------------<  102<H>  4.2   |  24  |  1.10    Ca    8.8      04 Apr 2020 06:02  Phos  2.8     04-04  Mg     2.7     04-04    TPro  7.6  /  Alb  2.2<L>  /  TBili  0.4  /  DBili  x   /  AST  36  /  ALT  36  /  AlkPhos  105  04-04      CAPILLARY BLOOD GLUCOSE      POCT Blood Glucose.: 83 mg/dL (05 Apr 2020 16:22)  POCT Blood Glucose.: 96 mg/dL (05 Apr 2020 11:25)  POCT Blood Glucose.: 94 mg/dL (05 Apr 2020 08:10)  POCT Blood Glucose.: 164 mg/dL (04 Apr 2020 21:39)

## 2020-04-06 LAB
ALBUMIN SERPL ELPH-MCNC: 2.3 G/DL — LOW (ref 3.5–5)
ALP SERPL-CCNC: 116 U/L — SIGNIFICANT CHANGE UP (ref 40–120)
ALT FLD-CCNC: 60 U/L DA — SIGNIFICANT CHANGE UP (ref 10–60)
ANION GAP SERPL CALC-SCNC: 4 MMOL/L — LOW (ref 5–17)
AST SERPL-CCNC: 60 U/L — HIGH (ref 10–40)
BASOPHILS # BLD AUTO: 0 K/UL — SIGNIFICANT CHANGE UP (ref 0–0.2)
BASOPHILS NFR BLD AUTO: 0 % — SIGNIFICANT CHANGE UP (ref 0–2)
BILIRUB SERPL-MCNC: 0.4 MG/DL — SIGNIFICANT CHANGE UP (ref 0.2–1.2)
BUN SERPL-MCNC: 26 MG/DL — HIGH (ref 7–18)
CALCIUM SERPL-MCNC: 8.5 MG/DL — SIGNIFICANT CHANGE UP (ref 8.4–10.5)
CHLORIDE SERPL-SCNC: 114 MMOL/L — HIGH (ref 96–108)
CO2 SERPL-SCNC: 27 MMOL/L — SIGNIFICANT CHANGE UP (ref 22–31)
CREAT SERPL-MCNC: 1.05 MG/DL — SIGNIFICANT CHANGE UP (ref 0.5–1.3)
CRP SERPL-MCNC: 0.91 MG/DL — HIGH (ref 0–0.4)
EOSINOPHIL # BLD AUTO: 0 K/UL — SIGNIFICANT CHANGE UP (ref 0–0.5)
EOSINOPHIL NFR BLD AUTO: 0 % — SIGNIFICANT CHANGE UP (ref 0–6)
ERYTHROCYTE [SEDIMENTATION RATE] IN BLOOD: 101 MM/HR — HIGH (ref 0–20)
FERRITIN SERPL-MCNC: 292 NG/ML — HIGH (ref 15–150)
GLUCOSE BLDC GLUCOMTR-MCNC: 100 MG/DL — HIGH (ref 70–99)
GLUCOSE BLDC GLUCOMTR-MCNC: 108 MG/DL — HIGH (ref 70–99)
GLUCOSE BLDC GLUCOMTR-MCNC: 125 MG/DL — HIGH (ref 70–99)
GLUCOSE BLDC GLUCOMTR-MCNC: 174 MG/DL — HIGH (ref 70–99)
GLUCOSE SERPL-MCNC: 102 MG/DL — HIGH (ref 70–99)
HCT VFR BLD CALC: 33.8 % — LOW (ref 34.5–45)
HGB BLD-MCNC: 10.3 G/DL — LOW (ref 11.5–15.5)
LYMPHOCYTES # BLD AUTO: 1.26 K/UL — SIGNIFICANT CHANGE UP (ref 1–3.3)
LYMPHOCYTES # BLD AUTO: 11 % — LOW (ref 13–44)
MAGNESIUM SERPL-MCNC: 2.3 MG/DL — SIGNIFICANT CHANGE UP (ref 1.6–2.6)
MANUAL SMEAR VERIFICATION: SIGNIFICANT CHANGE UP
MCHC RBC-ENTMCNC: 30.5 GM/DL — LOW (ref 32–36)
MCHC RBC-ENTMCNC: 30.6 PG — SIGNIFICANT CHANGE UP (ref 27–34)
MCV RBC AUTO: 100.3 FL — HIGH (ref 80–100)
MONOCYTES # BLD AUTO: 0.23 K/UL — SIGNIFICANT CHANGE UP (ref 0–0.9)
MONOCYTES NFR BLD AUTO: 2 % — SIGNIFICANT CHANGE UP (ref 2–14)
NEUTROPHILS # BLD AUTO: 9.94 K/UL — HIGH (ref 1.8–7.4)
NEUTROPHILS NFR BLD AUTO: 87 % — HIGH (ref 43–77)
NRBC # BLD: 0 /100 — SIGNIFICANT CHANGE UP (ref 0–0)
PHOSPHATE SERPL-MCNC: 2.6 MG/DL — SIGNIFICANT CHANGE UP (ref 2.5–4.5)
PLAT MORPH BLD: NORMAL — SIGNIFICANT CHANGE UP
PLATELET # BLD AUTO: 253 K/UL — SIGNIFICANT CHANGE UP (ref 150–400)
PLATELET COUNT - ESTIMATE: NORMAL — SIGNIFICANT CHANGE UP
POTASSIUM SERPL-MCNC: 4.5 MMOL/L — SIGNIFICANT CHANGE UP (ref 3.5–5.3)
POTASSIUM SERPL-SCNC: 4.5 MMOL/L — SIGNIFICANT CHANGE UP (ref 3.5–5.3)
PROT SERPL-MCNC: 7.3 G/DL — SIGNIFICANT CHANGE UP (ref 6–8.3)
RBC # BLD: 3.37 M/UL — LOW (ref 3.8–5.2)
RBC # FLD: 13.3 % — SIGNIFICANT CHANGE UP (ref 10.3–14.5)
RBC BLD AUTO: NORMAL — SIGNIFICANT CHANGE UP
SODIUM SERPL-SCNC: 145 MMOL/L — SIGNIFICANT CHANGE UP (ref 135–145)
WBC # BLD: 11.43 K/UL — HIGH (ref 3.8–10.5)
WBC # FLD AUTO: 11.43 K/UL — HIGH (ref 3.8–10.5)

## 2020-04-06 PROCEDURE — 99232 SBSQ HOSP IP/OBS MODERATE 35: CPT | Mod: CS

## 2020-04-06 RX ADMIN — Medication 10 MILLIGRAM(S): at 18:06

## 2020-04-06 RX ADMIN — MEMANTINE HYDROCHLORIDE 10 MILLIGRAM(S): 10 TABLET ORAL at 18:08

## 2020-04-06 RX ADMIN — MEMANTINE HYDROCHLORIDE 10 MILLIGRAM(S): 10 TABLET ORAL at 05:23

## 2020-04-06 RX ADMIN — Medication 25 MILLIGRAM(S): at 12:56

## 2020-04-06 RX ADMIN — Medication 10 MILLIGRAM(S): at 05:23

## 2020-04-06 RX ADMIN — ENOXAPARIN SODIUM 40 MILLIGRAM(S): 100 INJECTION SUBCUTANEOUS at 12:55

## 2020-04-06 RX ADMIN — Medication 25 MILLIGRAM(S): at 23:13

## 2020-04-06 RX ADMIN — Medication 25 MILLIGRAM(S): at 05:23

## 2020-04-06 RX ADMIN — AMLODIPINE BESYLATE 10 MILLIGRAM(S): 2.5 TABLET ORAL at 05:23

## 2020-04-06 RX ADMIN — ATORVASTATIN CALCIUM 40 MILLIGRAM(S): 80 TABLET, FILM COATED ORAL at 23:13

## 2020-04-06 NOTE — PHYSICAL THERAPY INITIAL EVALUATION ADULT - GENERAL OBSERVATIONS, REHAB EVAL
Consult received, chart reviewed. Patient received supine in bed, NAD, +NC @3.5 L. Patient agreed to EVALUATION from Physical Therapist.

## 2020-04-06 NOTE — PHYSICAL THERAPY INITIAL EVALUATION ADULT - CRITERIA FOR SKILLED THERAPEUTIC INTERVENTIONS
rehab potential/predicted duration of therapy intervention/anticipated discharge recommendation/therapy frequency/impairments found/functional limitations in following categories/risk reduction/prevention

## 2020-04-06 NOTE — PHYSICAL THERAPY INITIAL EVALUATION ADULT - PHYSICAL ASSIST/NONPHYSICAL ASSIST: GAIT, REHAB EVAL
verbal cues/nonverbal cues (demo/gestures)/1 person assist Posterior Auricular Interpolation Flap Text: A decision was made to reconstruct the defect utilizing an interpolation axial flap and a staged reconstruction.  A telfa template was made of the defect.  This telfa template was then used to outline the posterior auricular interpolation flap.  The donor area for the pedicle flap was then injected with anesthesia.  The flap was excised through the skin and subcutaneous tissue down to the layer of the underlying musculature.  The pedicle flap was carefully excised within this deep plane to maintain its blood supply.  The edges of the donor site were undermined.   The donor site was closed in a primary fashion.  The pedicle was then rotated into position and sutured.  Once the tube was sutured into place, adequate blood supply was confirmed with blanching and refill.  The pedicle was then wrapped with xeroform gauze and dressed appropriately with a telfa and gauze bandage to ensure continued blood supply and protect the attached pedicle.

## 2020-04-06 NOTE — PROGRESS NOTE ADULT - PROBLEM SELECTOR PLAN 3
creatinine 1.6 on admission - baseline unknown  will follow BMP     trended up , will encourage hydration    avoid nephrotoxins  gentle hydration  hold home med lasix  bmp daily

## 2020-04-06 NOTE — PHYSICAL THERAPY INITIAL EVALUATION ADULT - PLANNED THERAPY INTERVENTIONS, PT EVAL
bed mobility training/transfer training/gait training/strengthening/postural re-education/balance training

## 2020-04-06 NOTE — PHYSICAL THERAPY INITIAL EVALUATION ADULT - PATIENT PROFILE REVIEW, REHAB EVAL
including labs and imaging. Per MD pt. doing better and is appropriate for PT consult at this time./yes

## 2020-04-06 NOTE — PROGRESS NOTE ADULT - SUBJECTIVE AND OBJECTIVE BOX
MEDICAL ATTENDING NOTE     INTERVAL HPI: no new complaints    ROS: no CP, ++SOB but slowly  improving    MEDICATIONS  (STANDING):  amLODIPine   Tablet 10 milliGRAM(s) Oral daily  atorvastatin 40 milliGRAM(s) Oral at bedtime  dextrose 5%. 1000 milliLiter(s) (50 mL/Hr) IV Continuous <Continuous>  enoxaparin Injectable 40 milliGRAM(s) SubCutaneous daily  hydrALAZINE 25 milliGRAM(s) Oral every 8 hours  insulin lispro (HumaLOG) corrective regimen sliding scale   SubCutaneous three times a day before meals  insulin lispro (HumaLOG) corrective regimen sliding scale   SubCutaneous at bedtime  memantine 10 milliGRAM(s) Oral two times a day  metoprolol succinate  milliGRAM(s) Oral daily  oxybutynin 10 milliGRAM(s) Oral two times a day  pantoprazole    Tablet 40 milliGRAM(s) Oral before breakfast    MEDICATIONS  (PRN):  acetaminophen   Tablet .. 650 milliGRAM(s) Oral every 6 hours PRN Temp greater or equal to 38C (100.4F)  dextrose 40% Gel 15 Gram(s) Oral once PRN Blood Glucose LESS THAN 70 milliGRAM(s)/deciliter  guaiFENesin   Syrup  (Sugar-Free) 200 milliGRAM(s) Oral every 6 hours PRN Cough      Vital Signs Last 24 Hrs  T(C): 36.7 (06 Apr 2020 10:53), Max: 36.8 (05 Apr 2020 17:15)  T(F): 98 (06 Apr 2020 10:53), Max: 98.2 (05 Apr 2020 17:15)  HR: 80 (06 Apr 2020 12:07) (63 - 80)  BP: 158/59 (06 Apr 2020 12:07) (147/48 - 173/57)  BP(mean): --  RR: 17 (06 Apr 2020 10:53) (17 - 18)  SpO2: 100% (06 Apr 2020 12:07) (95% - 100%)    _________________  PHYSICAL EXAM:  ---------------------------   NAD; Normocephalic;   LUNGS - no wheezing; decreased bilateral air entry  HEART: S1 S2+   ABDOMEN: Soft, Nontender, non distended  EXTREMITIES: no cyanosis; no edema  NERVOUS SYSTEM:  Awake and alert; no focal neuro  deficits    _________________________________________________  LABS:      wbc?        CAPILLARY BLOOD GLUCOSE      POCT Blood Glucose.: 125 mg/dL (06 Apr 2020 08:04)  POCT Blood Glucose.: 126 mg/dL (05 Apr 2020 21:41)  POCT Blood Glucose.: 83 mg/dL (05 Apr 2020 16:22)      Impression /Plan:   1. Pneumonia due to COVID-19 with hypoxia  2. Hypoalbuminemia  3. Abnormal LFTs due to acute viral illness    - Oxygen supplementation as needed  - Proventil inhalers PRN  - continue current medications   - Encourage nutritional supplements   - monitor labs -including LFTs, ferritin, CRP  - Encourage prone positioning 10-15 mins /hr as tolerated  - consider steroids as clinically expedient   - Isolation precautions for COVID-19 per infection control protocol  - Low threshold for ICU consult  - Prognosis guarded    Plan of care was discussed  and aligned with patient's wishes.

## 2020-04-06 NOTE — PROGRESS NOTE ADULT - SUBJECTIVE AND OBJECTIVE BOX
PGY 1 Note discussed with supervising resident and primary attending    Patient is a 89y old  Female who presents with a chief complaint of acute hypoxic respiratory failure (06 Apr 2020 12:16)      INTERVAL HPI/OVERNIGHT EVENTS: saturating 97% on nasal cannula 3 L, a febrile    MEDICATIONS  (STANDING):  amLODIPine   Tablet 10 milliGRAM(s) Oral daily  atorvastatin 40 milliGRAM(s) Oral at bedtime  dextrose 5%. 1000 milliLiter(s) (50 mL/Hr) IV Continuous <Continuous>  enoxaparin Injectable 40 milliGRAM(s) SubCutaneous daily  hydrALAZINE 25 milliGRAM(s) Oral every 8 hours  insulin lispro (HumaLOG) corrective regimen sliding scale   SubCutaneous three times a day before meals  insulin lispro (HumaLOG) corrective regimen sliding scale   SubCutaneous at bedtime  memantine 10 milliGRAM(s) Oral two times a day  metoprolol succinate  milliGRAM(s) Oral daily  oxybutynin 10 milliGRAM(s) Oral two times a day  pantoprazole    Tablet 40 milliGRAM(s) Oral before breakfast    MEDICATIONS  (PRN):  acetaminophen   Tablet .. 650 milliGRAM(s) Oral every 6 hours PRN Temp greater or equal to 38C (100.4F)  dextrose 40% Gel 15 Gram(s) Oral once PRN Blood Glucose LESS THAN 70 milliGRAM(s)/deciliter  guaiFENesin   Syrup  (Sugar-Free) 200 milliGRAM(s) Oral every 6 hours PRN Cough      __________________________________________________  REVIEW OF SYSTEMS: Unable to do    Vital Signs Last 24 Hrs  T(C): 36.7 (06 Apr 2020 10:53), Max: 36.8 (05 Apr 2020 17:15)  T(F): 98 (06 Apr 2020 10:53), Max: 98.2 (05 Apr 2020 17:15)  HR: 80 (06 Apr 2020 12:07) (63 - 80)  BP: 158/59 (06 Apr 2020 12:07) (147/48 - 173/57)  BP(mean): --  RR: 17 (06 Apr 2020 10:53) (17 - 18)  SpO2: 100% (06 Apr 2020 12:07) (95% - 100%)    ________________________________________________  PHYSICAL EXAM:  GENERAL: elderly female  HEENT: Normocephalic;  conjunctivae and sclerae clear; moist mucous membranes;   NECK : supple  CHEST/LUNG: decreased air entry  HEART: S1 S2    ABDOMEN: Soft, Nontender, Nondistended; Bowel sounds present  EXTREMITIES: no cyanosis; no edema; no calf tenderness  SKIN: warm and dry; no rash  NERVOUS SYSTEM:  Awake and alert;  ; no new deficits    _________________________________________________  LABS:              CAPILLARY BLOOD GLUCOSE      POCT Blood Glucose.: 125 mg/dL (06 Apr 2020 08:04)  POCT Blood Glucose.: 126 mg/dL (05 Apr 2020 21:41)  POCT Blood Glucose.: 83 mg/dL (05 Apr 2020 16:22)        RADIOLOGY & ADDITIONAL TESTS:    Imaging Personally Reviewed:  YES/NO    Consultant(s) Notes Reviewed:   YES/ No    Care Discussed with Consultants :     Plan of care was discussed with patient and /or primary care giver; all questions and concerns were addressed and care was aligned with patient's wishes.

## 2020-04-06 NOTE — PHYSICAL THERAPY INITIAL EVALUATION ADULT - GAIT DISTANCE, PT EVAL
3ftx 2 trials, with rolling walker ~ 1 minute seated rest break. Pt. reported Mild level of exertion. Spo2 remained >95% on NC.

## 2020-04-06 NOTE — PHYSICAL THERAPY INITIAL EVALUATION ADULT - IMPAIRMENTS FOUND, PT EVAL
cognitive impairment/aerobic capacity/endurance/ventilation and respiration/gas exchange/gait, locomotion, and balance/muscle strength/posture

## 2020-04-06 NOTE — PHYSICAL THERAPY INITIAL EVALUATION ADULT - IMPAIRMENTS CONTRIBUTING TO GAIT DEVIATIONS, PT EVAL
impaired balance/cognition/narrow base of support/decreased ROM/impaired postural control/decreased strength

## 2020-04-07 LAB
GLUCOSE BLDC GLUCOMTR-MCNC: 110 MG/DL — HIGH (ref 70–99)
GLUCOSE BLDC GLUCOMTR-MCNC: 110 MG/DL — HIGH (ref 70–99)
GLUCOSE BLDC GLUCOMTR-MCNC: 111 MG/DL — HIGH (ref 70–99)
GLUCOSE BLDC GLUCOMTR-MCNC: 154 MG/DL — HIGH (ref 70–99)

## 2020-04-07 PROCEDURE — 99233 SBSQ HOSP IP/OBS HIGH 50: CPT | Mod: CS,GC

## 2020-04-07 RX ADMIN — ENOXAPARIN SODIUM 40 MILLIGRAM(S): 100 INJECTION SUBCUTANEOUS at 12:06

## 2020-04-07 RX ADMIN — MEMANTINE HYDROCHLORIDE 10 MILLIGRAM(S): 10 TABLET ORAL at 06:27

## 2020-04-07 RX ADMIN — Medication 25 MILLIGRAM(S): at 23:07

## 2020-04-07 RX ADMIN — MEMANTINE HYDROCHLORIDE 10 MILLIGRAM(S): 10 TABLET ORAL at 17:25

## 2020-04-07 RX ADMIN — Medication 25 MILLIGRAM(S): at 06:27

## 2020-04-07 RX ADMIN — Medication 10 MILLIGRAM(S): at 06:28

## 2020-04-07 RX ADMIN — ATORVASTATIN CALCIUM 40 MILLIGRAM(S): 80 TABLET, FILM COATED ORAL at 23:06

## 2020-04-07 RX ADMIN — Medication 10 MILLIGRAM(S): at 17:25

## 2020-04-07 RX ADMIN — Medication 100 MILLIGRAM(S): at 06:27

## 2020-04-07 RX ADMIN — AMLODIPINE BESYLATE 10 MILLIGRAM(S): 2.5 TABLET ORAL at 06:27

## 2020-04-07 RX ADMIN — Medication 25 MILLIGRAM(S): at 12:07

## 2020-04-07 RX ADMIN — PANTOPRAZOLE SODIUM 40 MILLIGRAM(S): 20 TABLET, DELAYED RELEASE ORAL at 06:27

## 2020-04-07 NOTE — PROGRESS NOTE ADULT - PROBLEM SELECTOR PLAN 5
starting hss  monitor fsg  HbA1c is 6.6
Monitor f/s  continue insulin sliding scale
starting hss  monitor fsg  HbA1c is 6.6

## 2020-04-07 NOTE — PROGRESS NOTE ADULT - PROBLEM SELECTOR PROBLEM 2
CAP (community acquired pneumonia)
Pneumonia

## 2020-04-07 NOTE — DISCHARGE NOTE PROVIDER - NSDCMRMEDTOKEN_GEN_ALL_CORE_FT
amLODIPine 10 mg oral tablet: 1 tab(s) orally once a day  azithromycin 500 mg oral tablet: 1 tab(s) orally once a day  hydrALAZINE 25 mg oral tablet: 1 tab(s) orally 3 times a day  hydroxychloroquine 200 mg oral tablet: 1 tab(s) orally once a day for 2 days  Januvia 50 mg oral tablet: 1 tab(s) orally once a day  losartan 100 mg oral tablet: 1 tab(s) orally once a day  memantine 10 mg oral tablet: 1 tab(s) orally 2 times a day  pravastatin 40 mg oral tablet: 1 tab(s) orally once a day  Protonix 40 mg oral delayed release tablet: 1 tab(s) orally once a day  tolterodine 4 mg oral capsule, extended release: 1 cap(s) orally once a day  Toprol- mg oral tablet, extended release: 1 tab(s) orally once a day

## 2020-04-07 NOTE — PROGRESS NOTE ADULT - REASON FOR ADMISSION
acute hypoxic respiratory failure

## 2020-04-07 NOTE — PROGRESS NOTE ADULT - PROBLEM SELECTOR PLAN 6
c/w memantine bid
stable;   continue memantine

## 2020-04-07 NOTE — PROGRESS NOTE ADULT - ATTENDING COMMENTS
Overall prognosis is guarded
Patient was seen and examined by myself. Case was discussed with house staff in details. I have reviewed and agree with the plan as outlined above with edits where appropriate.        ROS:  no palpitations, CP, SOB       Vital Signs Last 24 Hrs  T(C): 37 (02 Apr 2020 21:40), Max: 37 (02 Apr 2020 21:40)  T(F): 98.6 (02 Apr 2020 21:40), Max: 98.6 (02 Apr 2020 21:40)  HR: 72 (02 Apr 2020 21:40) (58 - 91)  BP: 168/56 (02 Apr 2020 21:40) (154/54 - 173/53)  BP(mean): --  RR: 18 (02 Apr 2020 21:40) (17 - 20)  SpO2: 97% (02 Apr 2020 21:40) (91% - 99%)    _________________  PHYSICAL EXAM:  ---------------------------   NAD; Normocephalic;   LUNGS - no wheezing  HEART: S1 S2+   ABDOMEN: Soft, Nontender, non distended  EXTREMITIES: no cyanosis; no edema  NERVOUS SYSTEM:  Awake and alert; no new deficits    _________________________________________________  LABS:                        10.6   12.68 )-----------( 365      ( 02 Apr 2020 05:43 )             34.2     04-02    148<H>  |  115<H>  |  45<H>  ----------------------------<  104<H>  3.6   |  27  |  1.44<H>    Ca    9.2      02 Apr 2020 05:43  Phos  2.4     04-02  Mg     3.1     04-02    TPro  7.9  /  Alb  2.3<L>  /  TBili  0.5  /  DBili  x   /  AST  45<H>  /  ALT  41  /  AlkPhos  99  04-02        CAPILLARY BLOOD GLUCOSE      POCT Blood Glucose.: 89 mg/dL (02 Apr 2020 22:32)  POCT Blood Glucose.: 166 mg/dL (02 Apr 2020 16:38)  POCT Blood Glucose.: 119 mg/dL (02 Apr 2020 12:07)  POCT Blood Glucose.: 129 mg/dL (02 Apr 2020 08:33)      Impression and Plan:  1.  Viral Pneumonia due to COVID-19 with possible superimposed bacterial infection and hypoxic respiratory failure  2.  Hypoalbuminemia  3.  Debility    - continue to monitor closely  - continue supplemental oxygen- NRM - wean to Nasal canula as tolerted  - Supportive measures  - Tylenol PRN for fever  -  Robitussin as needed for cough  - Continue Isolation precautions based on COVID-19 infection control protocol
Patient was seen and examined by myself. Case was discussed with house staff in details. I have reviewed and agree with the plan as outlined above with edits where appropriate.    Vital Signs Last 24 Hrs  T(C): 36.5 (26 Mar 2020 20:32), Max: 39.3 (26 Mar 2020 01:44)  T(F): 97.7 (26 Mar 2020 20:32), Max: 102.7 (26 Mar 2020 01:44)  HR: 75 (26 Mar 2020 20:32) (72 - 105)  BP: 123/59 (26 Mar 2020 20:32) (114/71 - 151/88)  RR: 17 (26 Mar 2020 20:32) (17 - 19)  SpO2: 97% (26 Mar 2020 20:32) (86% - 97%)                          10.8   13.34 )-----------( 184      ( 26 Mar 2020 07:09 )             34.0     03-26    141  |  105  |  29<H>  ----------------------------<  83  3.6   |  24  |  1.52<H>    Ca    8.6      26 Mar 2020 07:09  Phos  3.7     03-26  Mg     2.4     03-26    TPro  7.4  /  Alb  2.2<L>  /  TBili  0.6  /  DBili  x   /  AST  77<H>  /  ALT  31  /  AlkPhos  75  03-26    A/P: Pneumonia due to novel corona virus with possible superimposed bacterial infection  - Mildly elevated LFTS.  - Hypoalbuminemia    continue to monitor closely  supplemental oxygen as need  Supportive measures  Tylenol for fever  Robitussin for cough  Continue Isolation precautions based on COVID-19 infection control protocol
Patient was seen and examined by myself. Case was discussed with house staff in details. I have reviewed and agree with the plan as outlined above with edits where appropriate.    Vital Signs Last 24 Hrs  T(C): 36.6 (01 Apr 2020 21:20), Max: 36.8 (01 Apr 2020 01:39)  T(F): 97.9 (01 Apr 2020 21:20), Max: 98.3 (01 Apr 2020 01:39)  HR: 63 (01 Apr 2020 21:20) (50 - 79)  BP: 155/50 (01 Apr 2020 21:20) (117/42 - 165/59)  BP(mean): 101 (01 Apr 2020 13:29) (101 - 101)  RR: 18 (01 Apr 2020 21:20) (16 - 20)  SpO2: 98% (01 Apr 2020 21:20) (93% - 100%)                            10.1   11.06 )-----------( 337      ( 01 Apr 2020 05:45 )             32.5       04-01    145  |  113<H>  |  51<H>  ----------------------------<  144<H>  3.8   |  28  |  1.80<H>    Ca    9.0      01 Apr 2020 05:45  Phos  3.9     03-31  Mg     3.2     03-31    TPro  7.6  /  Alb  2.2<L>  /  TBili  0.4  /  DBili  x   /  AST  67<H>  /  ALT  44  /  AlkPhos  92  03-31      A/P:  Pneumonia due to novel corona virus with hypoxic respiratory failure  - Hypoalbuminemia  - Mild leukocytosis  - acute renal failure  - essential HTN      - continue to monitor closely  - continue supplemental oxygen as need  - Supportive measures  - Robitussin for cough  - Continue Isolation precautions based on COVID-19 infection control protocol
Patient was seen and examined by myself. Case was discussed with house staff in details. I have reviewed and agree with the plan as outlined above with edits where appropriate.    Vital Signs Last 24 Hrs  T(C): 36.6 (03 Apr 2020 21:31), Max: 36.6 (03 Apr 2020 21:31)  T(F): 97.9 (03 Apr 2020 21:31), Max: 97.9 (03 Apr 2020 21:31)  HR: 76 (03 Apr 2020 21:31) (60 - 77)  BP: 175/66 (03 Apr 2020 21:31) (141/77 - 175/66)  RR: 18 (03 Apr 2020 21:31) (17 - 18)  SpO2: 97% (03 Apr 2020 21:31) (93% - 98%)      continue to monitor.  Supportive care  Prognosis guarded
note reviewed   agreed with above
Patient was seen and examined by myself. Case was discussed with house staff in details. I have reviewed and agree with the plan as outlined above with edits where appropriate.    Vital Signs Last 24 Hrs  T(C): 36.3 (31 Mar 2020 21:25), Max: 36.5 (31 Mar 2020 10:46)  T(F): 97.4 (31 Mar 2020 21:25), Max: 97.7 (31 Mar 2020 10:46)  HR: 74 (31 Mar 2020 21:25) (63 - 74)  BP: 132/50 (31 Mar 2020 21:25) (121/43 - 147/85)  RR: 20 (31 Mar 2020 21:25) (20 - 22)  SpO2: 98% (31 Mar 2020 21:25) (98% - 100%)                          10.0   12.59 )-----------( 332      ( 31 Mar 2020 06:29 )             32.2       03-31    146<H>  |  113<H>  |  56<H>  ----------------------------<  76  3.9   |  26  |  1.99<H>    Ca    9.0      31 Mar 2020 06:29  Phos  3.9     03-31  Mg     3.2     03-31    TPro  7.6  /  Alb  2.2<L>  /  TBili  0.4  /  DBili  x   /  AST  67<H>  /  ALT  44  /  AlkPhos  92  03-31      A/P:  Acute Hypoxic respiratory failure  2. COVID 0 19 viral PNA  3. mild leukocytosis  4. Hypoalbuminemia  5. Debility and generalized muscle weakness    Overall prognosis is poor.  continue supplemental oxygen  Monitor  Other Plan as above
Patient was seen and examined by myself. Case was discussed with house staff in details. I have reviewed and agree with the plan as outlined above with edits where appropriate.    Vital Signs Last 24 Hrs  T(C): 36.6 (30 Mar 2020 22:10), Max: 36.7 (30 Mar 2020 11:44)  T(F): 97.8 (30 Mar 2020 22:10), Max: 98.1 (30 Mar 2020 11:44)  HR: 62 (30 Mar 2020 22:10) (62 - 75)  BP: 145/96 (30 Mar 2020 22:10) (133/58 - 152/81)  RR: 20 (30 Mar 2020 22:10) (20 - 32)  SpO2: 100% (30 Mar 2020 22:10) (89% - 100%)      03-30    142  |  108  |  46<H>  ----------------------------<  124<H>  4.1   |  25  |  1.79<H>    Ca    8.8      30 Mar 2020 08:15  Phos  3.7     03-30  Mg     3.0     03-30    TPro  7.7  /  Alb  2.3<L>  /  TBili  0.4  /  DBili  x   /  AST  68<H>  /  ALT  42  /  AlkPhos  83  03-30                            9.5    13.79 )-----------( 290      ( 30 Mar 2020 08:15 )             30.2       A/P: 1. Hypoxic respiratory failure  2. Pneumonia due to novel corona virus with possible superimposed bacterial infection + sepsis  3. Debility  4. Hypoalbuminemia  5. Essential HTN    Has been on empiric antibiotics - discontinue in am  continue to monitor closely  supplemental oxygen as need; wean off NRM as tolerated  Supportive measures  Tylenol for fever  Robitussin for cough  Continue Isolation precautions based on COVID-19 infection control protocol  Overall prognosis is guarded
Agree with all the above   Patient seen and examined by myself. c/o SOB and cough. She is on NR.   Vital signs and labs reviewed   Vital Signs Last 24 Hrs  T(C): 37 (27 Mar 2020 14:55), Max: 37.1 (26 Mar 2020 17:23)  T(F): 98.6 (27 Mar 2020 14:55), Max: 98.7 (26 Mar 2020 17:23)  HR: 87 (27 Mar 2020 14:55) (72 - 89)  BP: 113/41 (27 Mar 2020 14:55) (113/41 - 180/65)  BP(mean): --  RR: 18 (27 Mar 2020 14:55) (17 - 24)  SpO2: 93% (27 Mar 2020 14:55) (93% - 97%)    1. Acute hypoxic respiratory failure secondary to multifocal PNA secondary to COVID 19   C/w Supplemental O2, currently on NR, monitor SpO2. Concern for worsening respiratory status, pt patient is DNR/DNI   c/w Hydroxychloroquine along with Azithromycin and Ceftriaxone     2. MARIA E on CKD   3. Normocytic anemia   4. Type 2 DM with hypoglycemia: HBA1c 6.6. BG controlled on Humalog per SS   5. DVT prophylaxis

## 2020-04-07 NOTE — DISCHARGE NOTE PROVIDER - HOSPITAL COURSE
89 y/o female from [facility] with PMHx of HTN, DM, dementia (AAOx1-2) and HLD is sent to the ED for shortness of breath. Patient is a poor historian and denies any complaints. She denies cough or fever and reports feeling well at present. ABG confirms hypoxemia with pO2 68 on admission. Patient currently saturating at 97% on 4L NC. She is in no acute distress.        Patient was presented with cough, fever, and shortness of breath. Patient was placed on Non-Rebreather face mask  as patient was desaturating on room air and was 90% on NRB. Patient denied diarrhea, abdominal pain, and chest pain. Patient's chest XR showed diffuse b/l advanced disease. Patient was found to be COVID19+ and was placed on Ceftriaxone, Azithromycin, and Hydroxychloroquine.     PAtient then later switched to nasal cannula initially on 5 Lr then to 3 Lr. Patient was discharged hemodynamically stable saturating 92%+ on 3 Lr.    Patient is being discharged with O2.    Given patient's improved clinical status and current hemodynamic stability, decision was made to discharge the patient.    Patient is stable for discharge per attending and is advised to follow up with PCP as outpatient 89 y/o female from [facility] with PMHx of HTN, DM, dementia (AAOx1-2) and HLD is sent to the ED for shortness of breath. Patient is a poor historian and denies any complaints. She denies cough or fever and reports feeling well at present. ABG confirms hypoxemia with pO2 68 on admission. Patient currently saturating at 97% on 4L NC. She is in no acute distress.        Patient was presented with cough, fever, and shortness of breath. Patient was placed on Non-Rebreather face mask  as patient was desaturating on room air and was 90% on NRB. Patient denied diarrhea, abdominal pain, and chest pain. Patient's chest XR showed diffuse b/l advanced disease. Patient was found to be COVID19+ and was placed on Ceftriaxone, Azithromycin, and Hydroxychloroquine.     PAtient then later switched to nasal cannula initially on 5 Lr then to 3 Lr. Patient was discharged hemodynamically stable saturating 92%+ on 3 Lr.    Patient is being discharged with O2.    Given patient's improved clinical status and current hemodynamic stability, decision was made to discharge the patient.    Patient is stable for discharge per attending and is advised to follow up with PCP as outpatient.        ATTENDING ADDENDUM:    The final discharge diagnoses include    1. PNA due to COVID-19 with hypoxic respiratory failure and presumed sepsis    2. Acute renal failure possible ATN    3. Dementia

## 2020-04-07 NOTE — PROGRESS NOTE ADULT - PROBLEM SELECTOR PROBLEM 1
Acute hypoxemic respiratory failure

## 2020-04-07 NOTE — CHART NOTE - NSCHARTNOTEFT_GEN_A_CORE
Assessment:   89yFemalePatient is a 89y old  Female who presents with a chief complaint of acute hypoxic respiratory failure (07 Apr 2020 11:13)      Factors impacting intake: [ ] none [ ] nausea  [ ] vomiting [ ] diarrhea [ ] constipation  [ ]chewing problems [ ] swallowing issues  [x ] other: still unable to interview patient face to face as rule out covid-19 . per RN, patient with adequate oral intake in-house. also drinking the glucerna.    Diet Presciption: Diet, Dysphagia 2 Mechanical Soft-Thin Liquids:   Consistent Carbohydrate {Evening Snacks}  DASH/TLC {Sodium & Cholesterol Restricted}  Supplement Feeding Modality:  Oral  Glucerna Shake Cans or Servings Per Day:  1       Frequency:  Three Times a day (03-31-20 @ 11:55)    Intake: adequate    Current Weight: 59.1kg  % Weight change: patient with 1% wt loss in 7 days     Pertinent Medications: MEDICATIONS  (STANDING):  amLODIPine   Tablet 10 milliGRAM(s) Oral daily  atorvastatin 40 milliGRAM(s) Oral at bedtime  dextrose 5%. 1000 milliLiter(s) (50 mL/Hr) IV Continuous <Continuous>  enoxaparin Injectable 40 milliGRAM(s) SubCutaneous daily  hydrALAZINE 25 milliGRAM(s) Oral every 8 hours  insulin lispro (HumaLOG) corrective regimen sliding scale   SubCutaneous three times a day before meals  insulin lispro (HumaLOG) corrective regimen sliding scale   SubCutaneous at bedtime  memantine 10 milliGRAM(s) Oral two times a day  metoprolol succinate  milliGRAM(s) Oral daily  oxybutynin 10 milliGRAM(s) Oral two times a day  pantoprazole    Tablet 40 milliGRAM(s) Oral before breakfast    MEDICATIONS  (PRN):  acetaminophen   Tablet .. 650 milliGRAM(s) Oral every 6 hours PRN Temp greater or equal to 38C (100.4F)  dextrose 40% Gel 15 Gram(s) Oral once PRN Blood Glucose LESS THAN 70 milliGRAM(s)/deciliter  guaiFENesin   Syrup  (Sugar-Free) 200 milliGRAM(s) Oral every 6 hours PRN Cough    Pertinent Labs: 04-06 Na145 mmol/L Glu 102 mg/dL<H> K+ 4.5 mmol/L Cr  1.05 mg/dL BUN 26 mg/dL<H> 04-06 Phos 2.6 mg/dL 04-06 Alb 2.3 g/dL<L> 03-25 [id number] 6.6 %<H> 03-25 Chol 92 mg/dL LDL 39 mg/dL HDL 39 mg/dL<L> Trig 69 mg/dL     CAPILLARY BLOOD GLUCOSE      POCT Blood Glucose.: 110 mg/dL (07 Apr 2020 11:41)  POCT Blood Glucose.: 110 mg/dL (07 Apr 2020 07:51)  POCT Blood Glucose.: 174 mg/dL (06 Apr 2020 21:10)  POCT Blood Glucose.: 108 mg/dL (06 Apr 2020 17:37)  POCT Blood Glucose.: 100 mg/dL (06 Apr 2020 12:42)    Skin: No pressure ulcers     Estimated Needs:   [x ] no change since previous assessment  [ ] recalculated:     Previous Nutrition Diagnosis:   [ ] Inadequate Energy Intake [x ]Inadequate Oral Intake [ ] Excessive Energy Intake   [ ] Underweight [ ] Increased Nutrient Needs [ ] Overweight/Obesity   [ ] Altered GI Function [ ] Unintended Weight Loss [ ] Food & Nutrition Related Knowledge Deficit [ ] Malnutrition     Nutrition Diagnosis is [x ] ongoing  [ ] resolved [ ] not applicable     New Nutrition Diagnosis: [ ] not applicable       Interventions:   Recommend  [ ] Change Diet To:  [ ] Nutrition Supplement  [ ] Nutrition Support  [x ] Other: continue with dysphagia 2 Mechanical soft, thin liquids, carbohydrate consistent , DASH/TLC diet and glucerna tid     Monitoring and Evaluation:   [x ] PO intake [ x ] Tolerance to diet prescription [ x ] weights [ x ] labs[ x ] follow up per protocol  [ ] other:

## 2020-04-07 NOTE — PROGRESS NOTE ADULT - SUBJECTIVE AND OBJECTIVE BOX
PGY 1 Note discussed with supervising resident and primary attending    Patient is a 89y old  Female who presents with a chief complaint of acute hypoxic respiratory failure (06 Apr 2020 12:40)      INTERVAL HPI/OVERNIGHT EVENTS: patient was much more comfortable and awake and was responsive. saturating 96 % on 3 l.  a febrile    MEDICATIONS  (STANDING):  amLODIPine   Tablet 10 milliGRAM(s) Oral daily  atorvastatin 40 milliGRAM(s) Oral at bedtime  dextrose 5%. 1000 milliLiter(s) (50 mL/Hr) IV Continuous <Continuous>  enoxaparin Injectable 40 milliGRAM(s) SubCutaneous daily  hydrALAZINE 25 milliGRAM(s) Oral every 8 hours  insulin lispro (HumaLOG) corrective regimen sliding scale   SubCutaneous three times a day before meals  insulin lispro (HumaLOG) corrective regimen sliding scale   SubCutaneous at bedtime  memantine 10 milliGRAM(s) Oral two times a day  metoprolol succinate  milliGRAM(s) Oral daily  oxybutynin 10 milliGRAM(s) Oral two times a day  pantoprazole    Tablet 40 milliGRAM(s) Oral before breakfast    MEDICATIONS  (PRN):  acetaminophen   Tablet .. 650 milliGRAM(s) Oral every 6 hours PRN Temp greater or equal to 38C (100.4F)  dextrose 40% Gel 15 Gram(s) Oral once PRN Blood Glucose LESS THAN 70 milliGRAM(s)/deciliter  guaiFENesin   Syrup  (Sugar-Free) 200 milliGRAM(s) Oral every 6 hours PRN Cough      __________________________________________________  REVIEW OF SYSTEMS:  Unable to do.    Vital Signs Last 24 Hrs  T(C): 37 (07 Apr 2020 11:04), Max: 37 (07 Apr 2020 11:04)  T(F): 98.6 (07 Apr 2020 11:04), Max: 98.6 (07 Apr 2020 11:04)  HR: 66 (07 Apr 2020 11:04) (66 - 83)  BP: 127/81 (07 Apr 2020 11:04) (127/81 - 158/59)  BP(mean): --  RR: 18 (07 Apr 2020 11:04) (17 - 18)  SpO2: 98% (07 Apr 2020 11:04) (96% - 100%)    ________________________________________________  PHYSICAL EXAM:  GENERAL: elderly female comfortable on nasal cannula  HEENT: Normocephalic;  conjunctivae and sclerae clear;   NECK : supple  CHEST/LUNG: decreased air entry   HEART: S1 S2  regular;   ABDOMEN: Soft, Nontender, Nondistended; Bowel sounds present  EXTREMITIES: no cyanosis; no edema; no calf tenderness  SKIN: warm and dry; no rash  NERVOUS SYSTEM:  Awake and alert;  no new deficits    _________________________________________________  LABS:                        10.3   11.43 )-----------( 253      ( 06 Apr 2020 13:24 )             33.8     04-06    145  |  114<H>  |  26<H>  ----------------------------<  102<H>  4.5   |  27  |  1.05    Ca    8.5      06 Apr 2020 13:24  Phos  2.6     04-06  Mg     2.3     04-06    TPro  7.3  /  Alb  2.3<L>  /  TBili  0.4  /  DBili  x   /  AST  60<H>  /  ALT  60  /  AlkPhos  116  04-06        CAPILLARY BLOOD GLUCOSE      POCT Blood Glucose.: 110 mg/dL (07 Apr 2020 07:51)  POCT Blood Glucose.: 174 mg/dL (06 Apr 2020 21:10)  POCT Blood Glucose.: 108 mg/dL (06 Apr 2020 17:37)  POCT Blood Glucose.: 100 mg/dL (06 Apr 2020 12:42)        RADIOLOGY & ADDITIONAL TESTS:    Imaging Personally Reviewed:  YES/NO    Consultant(s) Notes Reviewed:   YES/ No    Care Discussed with Consultants :     Plan of care was discussed with patient and /or primary care giver; all questions and concerns were addressed and care was aligned with patient's wishes.

## 2020-04-07 NOTE — PROGRESS NOTE ADULT - PROBLEM SELECTOR PROBLEM 3
MARIA E (acute kidney injury)

## 2020-04-07 NOTE — DISCHARGE NOTE PROVIDER - NSDCCPCAREPLAN_GEN_ALL_CORE_FT
PRINCIPAL DISCHARGE DIAGNOSIS  Diagnosis: Acute hypoxemic respiratory failure  Assessment and Plan of Treatment: CORONAVIRUS INSTRUCTIONS:   Based on your current clinical status and stability, it has been determined that you no longer need hospitalization and can recover while remaining in self-quarantine at home. You should follow the prevention steps below until a healthcare provider or local or state health department says you can return to your normal activities.   1. You should restrict activities outside your home, except for getting medical care.   2. Do not go to work, school, or public areas.   3. Avoid using public transportation, ride-sharing, or taxis.   4. Separate yourself from other people and animals in your home as much as possible.  When you are around other people (e.g., sharing a room or vehicle) you should wear a facemask.  5. Wash your hands often with soap and water for at least 20 seconds, especially after blowing your nose, coughing, or sneezing; going to the bathroom; and before eating or preparing food.  6. Cover your mouth and nose with a tissue when you cough or sneeze. Throw used tissues in a lined trash can. Immediately wash your hands with soap and water for at least 20 seconds  7. High touch surfaces include counters, tabletops, doorknobs, bathroom fixtures, toilets, phones, keyboards, tablets, and bedside tables.  8. Avoid sharing dishes, drinking glasses, cups, eating utensils, towels, or bedding with other people or pets in your home. After using these items, they should be washed thoroughly with soap and water.  You are strongly advised to seek prompt medical attention if your illness worsens or you develop new symptoms like fever or difficulty breathing.        SECONDARY DISCHARGE DIAGNOSES  Diagnosis: DM (diabetes mellitus)  Assessment and Plan of Treatment: Maintaining blood glucose level within normal range.  - You have a history of diabetes  - You should continue to take your medication regimen regularly as prescribed  - Please follow up with your primary care provider/endocrinologist within a week of discharge.  - You need to continue monitoring your blood sugar levels closely.  - Please maintain healthy lifestyle by eating healthy diabetic regimen, weight loss and exercise regularly as tolerated.  Make sure you get your HgA1c checked every three months.  If you take oral diabetes medications, check your blood glucose two times a day.  If you take insulin, check your blood glucose before meals and at bedtime.  It's important not to skip any meals.  Keep a log of your blood glucose results and always take it with you to your doctor appointments.  Keep a list of your current medications including injectables and over the counter medications and bring this medication list with you to all your doctor appointments.  If you have not seen your ophthalmologist this year call for appointment.  Check your feet daily for redness, sores, or openings. Do not self treat. If no improvement in two days call your primary care physician for an appointment.  Low blood sugar (hypoglycemia) is a blood sugar below 70mg/dl. Check your blood sugar if you feel signs/symptoms of hypoglycemia. If your blood sugar is below 70 take 15 grams of carbohydrates (ex 4 oz of apple juice, 3-4 glucose tablets, or 4-6 oz of regular soda) wait 15 minutes and repeat blood sugar to make sure it comes up above 70.  If your blood sugar is above 70 and you are due for a meal, have a meal.  If you are not due for a meal have a snack.  This snack helps keeps your blood sugar at a safe range.      Diagnosis: HTN (hypertension)  Assessment and Plan of Treatment: Blood Pressure Control , Please continue current medication regimen, and follow up with your PCP  - You have a history of Hypertension.    - You should continue on the current antihypertensive regimen regularly.  - You blood pressure should be within 140-120/80-90.  - You should follow-up with your PCP within 1 week of your discharge for routine blood pressure monitoring at your next visit.  - Notify your doctor if you have any of the following symptoms:   (Dizziness, Lightheadedness, Blurry vision, Headache, Chest pain, Shortness of breath.)  - You should maintain healthy lifestyle by eating healthy low salt diet, avoid fatty food, weight loss, exercise regularly as tolerated 30 mins X 3 time per week.

## 2020-04-07 NOTE — PROGRESS NOTE ADULT - PROBLEM SELECTOR PLAN 2
management as above  c/w abx
Pneumonia due to novel corona virus with possible superimposed bacterial infection + sepsis  continue empiric antibiotics  continue to monitor closely  supplemental oxygen as need  Supportive measures  Tylenol for fever  Robitussin for cough  Continue Isolation precautions based on COVID-19 infection control protocol
management as above  -antibiotics course completed
management as above  c/w abx
management as above  c/w abx

## 2020-04-07 NOTE — PROGRESS NOTE ADULT - ASSESSMENT
HPI:  88 y/o female from [facility] with PMHx of HTN, DM, dementia (AAOx1-2) and HLD is sent to the ED for shortness of breath. Patient is a poor historian and denies any complaints. She denies cough or fever and reports feeling well at present. ABG confirms hypoxemia with pO2 68 on admission. Patient currently saturating at 97% on 4L NC. She is in no acute distress.

## 2020-04-08 VITALS
SYSTOLIC BLOOD PRESSURE: 156 MMHG | TEMPERATURE: 98 F | DIASTOLIC BLOOD PRESSURE: 44 MMHG | OXYGEN SATURATION: 99 % | RESPIRATION RATE: 18 BRPM | HEART RATE: 61 BPM | WEIGHT: 130.29 LBS

## 2020-04-08 LAB
ANION GAP SERPL CALC-SCNC: 7 MMOL/L — SIGNIFICANT CHANGE UP (ref 5–17)
BUN SERPL-MCNC: 18 MG/DL — SIGNIFICANT CHANGE UP (ref 7–18)
CALCIUM SERPL-MCNC: 8.5 MG/DL — SIGNIFICANT CHANGE UP (ref 8.4–10.5)
CHLORIDE SERPL-SCNC: 110 MMOL/L — HIGH (ref 96–108)
CO2 SERPL-SCNC: 26 MMOL/L — SIGNIFICANT CHANGE UP (ref 22–31)
CREAT SERPL-MCNC: 0.89 MG/DL — SIGNIFICANT CHANGE UP (ref 0.5–1.3)
GLUCOSE BLDC GLUCOMTR-MCNC: 79 MG/DL — SIGNIFICANT CHANGE UP (ref 70–99)
GLUCOSE SERPL-MCNC: 80 MG/DL — SIGNIFICANT CHANGE UP (ref 70–99)
HCT VFR BLD CALC: 30.8 % — LOW (ref 34.5–45)
HGB BLD-MCNC: 9.7 G/DL — LOW (ref 11.5–15.5)
MAGNESIUM SERPL-MCNC: 2.2 MG/DL — SIGNIFICANT CHANGE UP (ref 1.6–2.6)
MCHC RBC-ENTMCNC: 31.5 GM/DL — LOW (ref 32–36)
MCHC RBC-ENTMCNC: 31.5 PG — SIGNIFICANT CHANGE UP (ref 27–34)
MCV RBC AUTO: 100 FL — SIGNIFICANT CHANGE UP (ref 80–100)
NRBC # BLD: 0 /100 WBCS — SIGNIFICANT CHANGE UP (ref 0–0)
PHOSPHATE SERPL-MCNC: 2.4 MG/DL — LOW (ref 2.5–4.5)
PLATELET # BLD AUTO: 213 K/UL — SIGNIFICANT CHANGE UP (ref 150–400)
POTASSIUM SERPL-MCNC: 4.1 MMOL/L — SIGNIFICANT CHANGE UP (ref 3.5–5.3)
POTASSIUM SERPL-SCNC: 4.1 MMOL/L — SIGNIFICANT CHANGE UP (ref 3.5–5.3)
RBC # BLD: 3.08 M/UL — LOW (ref 3.8–5.2)
RBC # FLD: 13.3 % — SIGNIFICANT CHANGE UP (ref 10.3–14.5)
SODIUM SERPL-SCNC: 143 MMOL/L — SIGNIFICANT CHANGE UP (ref 135–145)
WBC # BLD: 11.88 K/UL — HIGH (ref 3.8–10.5)
WBC # FLD AUTO: 11.88 K/UL — HIGH (ref 3.8–10.5)

## 2020-04-08 PROCEDURE — 99239 HOSP IP/OBS DSCHRG MGMT >30: CPT

## 2020-04-08 RX ADMIN — AMLODIPINE BESYLATE 10 MILLIGRAM(S): 2.5 TABLET ORAL at 05:53

## 2020-04-08 RX ADMIN — Medication 25 MILLIGRAM(S): at 05:53

## 2020-04-08 RX ADMIN — Medication 10 MILLIGRAM(S): at 05:53

## 2020-04-08 RX ADMIN — Medication 100 MILLIGRAM(S): at 05:54

## 2020-04-08 RX ADMIN — MEMANTINE HYDROCHLORIDE 10 MILLIGRAM(S): 10 TABLET ORAL at 05:53

## 2020-04-08 NOTE — DISCHARGE NOTE NURSING/CASE MANAGEMENT/SOCIAL WORK - PATIENT PORTAL LINK FT
You can access the FollowMyHealth Patient Portal offered by [facility] by registering at the following website: [email or web address]. By joining [employer]’s FollowMyHealth portal, you will also be able to view your health information using other applications (apps) compatible with our system.

## 2020-04-30 PROCEDURE — 97162 PT EVAL MOD COMPLEX 30 MIN: CPT

## 2020-04-30 PROCEDURE — 80053 COMPREHEN METABOLIC PANEL: CPT

## 2020-04-30 PROCEDURE — 85027 COMPLETE CBC AUTOMATED: CPT

## 2020-04-30 PROCEDURE — 87631 RESP VIRUS 3-5 TARGETS: CPT

## 2020-04-30 PROCEDURE — 96374 THER/PROPH/DIAG INJ IV PUSH: CPT

## 2020-04-30 PROCEDURE — 82728 ASSAY OF FERRITIN: CPT

## 2020-04-30 PROCEDURE — 83036 HEMOGLOBIN GLYCOSYLATED A1C: CPT

## 2020-04-30 PROCEDURE — 96375 TX/PRO/DX INJ NEW DRUG ADDON: CPT

## 2020-04-30 PROCEDURE — 84443 ASSAY THYROID STIM HORMONE: CPT

## 2020-04-30 PROCEDURE — 87486 CHLMYD PNEUM DNA AMP PROBE: CPT

## 2020-04-30 PROCEDURE — 36415 COLL VENOUS BLD VENIPUNCTURE: CPT

## 2020-04-30 PROCEDURE — 87040 BLOOD CULTURE FOR BACTERIA: CPT

## 2020-04-30 PROCEDURE — 87798 DETECT AGENT NOS DNA AMP: CPT

## 2020-04-30 PROCEDURE — 82607 VITAMIN B-12: CPT

## 2020-04-30 PROCEDURE — 71045 X-RAY EXAM CHEST 1 VIEW: CPT

## 2020-04-30 PROCEDURE — 86140 C-REACTIVE PROTEIN: CPT

## 2020-04-30 PROCEDURE — 84100 ASSAY OF PHOSPHORUS: CPT

## 2020-04-30 PROCEDURE — U0001: CPT

## 2020-04-30 PROCEDURE — 80061 LIPID PANEL: CPT

## 2020-04-30 PROCEDURE — 87633 RESP VIRUS 12-25 TARGETS: CPT

## 2020-04-30 PROCEDURE — 83735 ASSAY OF MAGNESIUM: CPT

## 2020-04-30 PROCEDURE — 80048 BASIC METABOLIC PNL TOTAL CA: CPT

## 2020-04-30 PROCEDURE — 87581 M.PNEUMON DNA AMP PROBE: CPT

## 2020-04-30 PROCEDURE — 82746 ASSAY OF FOLIC ACID SERUM: CPT

## 2020-04-30 PROCEDURE — 85652 RBC SED RATE AUTOMATED: CPT

## 2020-04-30 PROCEDURE — 93005 ELECTROCARDIOGRAM TRACING: CPT

## 2020-04-30 PROCEDURE — 99285 EMERGENCY DEPT VISIT HI MDM: CPT

## 2020-04-30 PROCEDURE — 82962 GLUCOSE BLOOD TEST: CPT

## 2022-10-19 NOTE — ED PROVIDER NOTE - CPE EDP SKIN NORM
capsule by mouth every Sunday AND 2000 UNITS DAILY EXCEPT NATASHA 3/16/22   Cortney Good MD   Multiple Vitamins-Minerals (MULTIVITAMIN ADULT) TABS Take 1 tablet by mouth daily 4/30/20 3/16/21  Saba Huggins, APRN - CNP       Current medications:    Current Facility-Administered Medications   Medication Dose Route Frequency Provider Last Rate Last Admin    lactated ringers infusion   IntraVENous Continuous Sami Hoffmann MD 75 mL/hr at 10/18/22 1202 New Bag at 10/18/22 1202    amLODIPine (NORVASC) tablet 10 mg  10 mg Oral Daily Helder Zaldivar MD   10 mg at 10/18/22 0836    acetaminophen (TYLENOL) tablet 500 mg  500 mg Oral Q8H PRN Helder Zaldivar MD        lidocaine 4 % external patch 1 patch  1 patch TransDERmal Daily Helder Zaldivar MD   1 patch at 10/18/22 0835    ceFAZolin (ANCEF) 2,000 mg in sterile water 20 mL IV syringe  2,000 mg IntraVENous See Admin Instructions Sami Hoffmann MD        pantoprazole (PROTONIX) 40 mg in sodium chloride (PF) 10 mL injection  40 mg IntraVENous Daily Helder Zaldivar MD   40 mg at 10/18/22 0836    fentaNYL (SUBLIMAZE) injection 25 mcg  25 mcg IntraVENous Q4H PRN Helder Zaldivar MD   25 mcg at 10/19/22 0524    [Held by provider] aspirin EC tablet 81 mg  81 mg Oral Daily Helder Zaldivar MD        escitalopram (LEXAPRO) tablet 5 mg  5 mg Oral Daily Helder Zaldivar MD   5 mg at 10/18/22 0836    isosorbide mononitrate (IMDUR) extended release tablet 30 mg  30 mg Oral Daily Helder Zaldivar MD   30 mg at 10/18/22 0836    metoprolol succinate (TOPROL XL) extended release tablet 50 mg  50 mg Oral BID Helder Zaldivar MD   50 mg at 10/18/22 2043    rosuvastatin (CRESTOR) tablet 40 mg  40 mg Oral Daily Helder Zaldivar MD   40 mg at 10/18/22 0836    [Held by provider] ticagrelor (BRILINTA) tablet 90 mg  90 mg Oral BID Helder Zaldivar MD        ferrous sulfate (IRON 325) tablet 325 mg  325 mg Oral BID WC Helder Zaldivar MD   325 mg at 10/18/22 1600    sodium chloride flush 0.9 % injection 5-40 mL  5-40 mL IntraVENous 2 times per day Helder Zaldivar MD   10 mL at 10/18/22 0837    sodium chloride flush 0.9 % injection 5-40 mL  5-40 mL IntraVENous PRN Helder Zaldivar MD   10 mL at 10/18/22 1601    0.9 % sodium chloride infusion   IntraVENous PRN Helder Zaldivar MD        polyethylene glycol [facility]) packet 17 g  17 g Oral Daily PRN Helder Zaldivar MD        piperacillin-tazobactam (ZOSYN) 3,375 mg in sodium chloride 0.9 % 50 mL IVPB ([id number])  3,375 mg IntraVENous Q8H Dorota Cox MD 12.5 mL/hr at 10/19/22 0515 3,375 mg at 10/19/22 0515    ondansetron (ZOFRAN) injection 4 mg  4 mg IntraVENous Q6H PRN Helder Zaldivar MD   4 mg at 10/18/22 2210       Allergies:     Allergies   Allergen Reactions    Ultram [Tramadol] Other (See Comments)     Pt states she had a seizure after taking ultram       Problem List:    Patient Active Problem List   Diagnosis Code    Abdominal tenderness, LLQ (left lower quadrant) R10.814    Postoperative anemia due to acute blood loss D62    Chronic pain G89.29    Anorexia R63.0    H/O gastric bypass Z98.84    Back pain M54.9    Encounter for palliative care Z51.5    Hypertension I10    Epigastric pain R10.13    GI bleed due to NSAIDs K92.2, T39.395A    Gastrointestinal hemorrhage associated with gastric ulcer K25.4    Acute cystitis without hematuria N30.00    Current severe 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 prior episode (HCC) F32.2    Acute MI, 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    Chest pain R07.9    Bilateral renal artery stenosis (HCC) I70.1    CAD in native artery I25.10    DVT (deep vein thrombosis) in pregnancy O22.30    Leg swelling M79.89    Abdominal pain R10.9    Cholecystitis, acute K81.0    Acute cholecystitis due to biliary calculus K80.00       Past Medical History:        Diagnosis Date    Anorexia 06/01/2015    Arthritis of knee     Back pain, chronic     Bilateral renal artery stenosis ([facility] [address] 03/13/2022    CAD (coronary artery disease)     Chronic pain 06/01/2015    Current severe 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 prior episode ([facility] [address] 03/16/2021    H/O gastric bypass 06/01/2015    Hypertension 01/01/2012    Hypertension 22/02/4082    Metabolic acidosis 57/35/5222    Ovarian cyst     Seizures ([facility] [address]        Past Surgical History:        Procedure Laterality Date    CHOLECYSTECTOMY, LAPAROSCOPIC  09/19/1995    [address] [place] WITH STENT PLACEMENT  03/12/2022    CORONARY ARTERY BYPASS GRAFT N/A 6/22/2022    CABG CORONARY ARTERY BYPASS GRAFTING x 3 NARGIS performed by Ulysses Navy, MD at [facility]  2003    [facility]    GASTRIC BYPASS SURGERY      HERNIA REPAIR      umbil    HYSTERECTOMY (CERVIX STATUS UNKNOWN) Left 02/05/2013    Laparotomy;HUGO;JOSUÉ:LSO    LAPAROSCOPY  02/12/2009    lap assisted percutaneous ERCP and removal CBD stone and gastrostomy, [address]  12/28/2011    exp lap, hugo, rt so/ repair hernia    EMILY-EN-Y GASTRIC BYPASS  12/02/2008    laparoscopic RYGB, Dr. Alba Moritz, [address] [place] [address]  06/25/2010    resection of jejunal ulcer, reversal gastric bypass, jejunoduodenostomy, feeding jejunostomy, Dr. Alba Moritz, [address]  10/03/2008    mild gastritis & duodenitis, Dr. Alba Moritz, [address] ENDOSCOPY  01/09/2009    multiple severe anastomotic jejunal ulcers, Dr. Alba Moritz.  [facility]    UPPER GASTROINTESTINAL ENDOSCOPY  02/04/2009    ulcers healed, normal, Dr. Latoya Reynolds, [address] ENDOSCOPY  03/06/2009    recurrent severe anastomotic jejunal ulcers, Dr. Alba Moritz, [address] ENDOSCOPY  06/04/2009    severe anastomotic jejunal ulcers, Dr. Alba Moritz, [address] ENDOSCOPY  07/02/2009    severe anastomotic jejunal ulcers, Dr. Alba Moritz, [address] [facility] UPPER GASTROINTESTINAL ENDOSCOPY  10/27/2009    severe anastomotic jejunal ulcers, Dr. Lucille Diaz, [address] [address] GASTROINTESTINAL ENDOSCOPY  2010    severe anastomotic jejunal ulcers, Dr. Lucille Diaz, [address] [address] GASTROINTESTINAL ENDOSCOPY  2010    severe anastomotic jejunal ulcers, Dr. Lucille Diaz, [address] [address] GASTROINTESTINAL ENDOSCOPY  2010    gastritis, Dr. Lucille Diaz, [address] [facility] UPPER GASTROINTESTINAL ENDOSCOPY N/A 05/10/2019    EGD ESOPHAGOGASTRODUODENOSCOPY performed by Ashwin Jimenez MD at [place] [address] N/A 2019    EGD DIAGNOSTIC ONLY performed by Ashwin Jimenez MD at [place] [address] N/A 2019    EGD ESOPHAGOGASTRODUODENOSCOPY performed by Polo Katz MD at [facility] History:    Social History     Tobacco Use    Smoking status: Former     Packs/day: 0.50     Years: 15.00     Pack years: 7.50     Types: Cigarettes     Quit date: 3/11/2022     Years since quittin.6    Smokeless tobacco: Never   Substance Use Topics    Alcohol use: Not Currently     Comment: rare                                Counseling given: Not Answered      Vital Signs (Current):   Vitals:    10/17/22 1721 10/17/22 1936 10/18/22 0753 10/18/22 2035   BP: (!) 154/94 (!) 153/85 (!) 140/81 136/73   Pulse:  64 54 55   Resp:  16 16 18   Temp:  36.4 °C (97.5 °F) 36.6 °C (97.9 °F) 36.6 °C (97.9 °F)   TempSrc:  Temporal Temporal    SpO2:  96% 97% 97%   Weight:       Height:                                                  BP Readings from Last 3 Encounters:   10/18/22 136/73   22 126/76   22 104/65       NPO Status:  > 8 hours                                                                                BMI:   Wt Readings from Last 3 Encounters:   10/15/22 209 lb (94.8 kg)   22 195 lb (88.5 kg)   22 193 lb (87.5 kg)     Body mass index is 35.87 kg/m².     CBC:   Lab Results   Component Value Date/Time    WBC 6.1 10/18/2022 06:55 AM    RBC 4.78 10/18/2022 06:55 AM    HGB 14.0 10/18/2022 06:55 AM    HCT 43.0 10/18/2022 06:55 AM    HCT 26.0 06/22/2022 10:08 AM    MCV 90.0 10/18/2022 06:55 AM    RDW 13.6 10/18/2022 06:55 AM     10/18/2022 06:55 AM       CMP:   Lab Results   Component Value Date/Time     10/18/2022 06:55 AM    K 4.1 10/18/2022 06:55 AM    K 3.7 08/07/2022 06:38 AM     10/18/2022 06:55 AM    CO2 21 10/18/2022 06:55 AM    BUN 18 10/18/2022 06:55 AM    CREATININE 1.2 10/18/2022 06:55 AM    GFRAA >60 10/17/2022 06:51 AM    LABGLOM 55 10/18/2022 06:55 AM    GLUCOSE 84 10/18/2022 06:55 AM    GLUCOSE 83 04/28/2012 12:30 PM    PROT 8.2 10/18/2022 06:55 AM    CALCIUM 9.9 10/18/2022 06:55 AM    BILITOT 0.8 10/18/2022 06:55 AM    ALKPHOS 345 10/18/2022 06:55 AM    AST 82 10/18/2022 06:55 AM     10/18/2022 06:55 AM       POC Tests: No results for input(s): POCGLU, POCNA, POCK, POCCL, POCBUN, POCHEMO, POCHCT in the last 72 hours.     Coags:   Lab Results   Component Value Date/Time    PROTIME 13.7 06/22/2022 11:00 AM    PROTIME 12.6 02/24/2012 12:40 AM    INR 1.3 06/22/2022 11:00 AM    APTT 37.6 08/07/2022 01:01 AM       HCG (If Applicable):   Lab Results   Component Value Date    PREGTESTUR NEG 07/25/2018    PREGSERUM NEGATIVE 02/24/2012        ABGs: No results found for: PHART, PO2ART, YGQ1DST, VCH6VSN, BEART, L8EJIUTG     Type & Screen (If Applicable):  Lab Results   Component Value Date    LABABO A 12/28/2011    [address] POS 12/28/2011       Drug/Infectious Status (If Applicable):  No results found for: HIV, HEPCAB    COVID-19 Screening (If Applicable): No results found for: COVID19    EKG: 10/15/2022  Normal sinus rhythm  Nonspecific T wave abnormality  Abnormal ECG  When compared with ECG of 05-AUG-2022 17:22,  No significant change was found  Confirmed by Ashleigh Hughes ([id number]) on 10/17/2022 6:01:05 PM    ECHO: 6/14/2022   Findings      Left Ventricle   Normal left ventricle size and systolic function. Ejection fraction is visually estimated at 55-60%. No regional wall motion abnormalities seen. Mild concentric left ventricular hypertrophy. Normal diastolic function. Right Ventricle   Normal right ventricular size and function. TAPSE 23 mm. Left Atrium   Left atrium is of normal size. Right Atrium   Normal right atrium size. Mitral Valve   Normal mitral valve structure and function. No evidence of mitral valve stenosis. Physiologic and/or trace mitral regurgitation is present. Tricuspid Valve   The tricuspid valve appears structurally normal.   Physiologic and/or trace tricuspid regurgitation. RVSP is 22 mmHg. Normal estimated PA systolic pressure. Aortic Valve   Structurally normal aortic valve. The aortic valve is trileaflet. No hemodynamically significant aortic stenosis is present. No evidence of aortic valve regurgitation. Pulmonic Valve   Pulmonic valve is structurally normal.   Physiologic and/or trace pulmonic regurgitation present. No evidence of pulmonic valve stenosis. Pericardial Effusion   No evidence for hemodynamically significant pericardial effusion. Pleural Effusion   No evidence of pleural effusion. Aorta   Aortic root dimension within normal limits. Miscellaneous   The inferior vena cava diameter is normal with normal respiratory   variation. Conclusions      Summary   Normal left ventricle size and systolic function. Ejection fraction is visually estimated at 55-60%. No regional wall motion abnormalities seen. Mild concentric left ventricular hypertrophy. Normal diastolic function. Normal right ventricular size and function. TAPSE 23 mm. No hemodynamically significant aortic stenosis is present. Physiologic and/or trace tricuspid regurgitation. RVSP is 22 mmHg. Normal estimated PA systolic pressure. No evidence for hemodynamically significant pericardial effusion. Signature      ----------------------------------------------------------------   Electronically signed by Tara Ma MD(Interpreting   physician) on 06/14/2022 08:00 PM   ----------------------------------------------------------------    CT: 10/15/2022  1. Biliary dilation redemonstrated, mildly worse.  Suggestion of a small   chronic cystic duct calculus. 2. Suggestion of constipation and there are small bowel air-fluid levels. 3. Additionally dilation with retained contents within the gastric bypass   including afferent limb which is abnormal but present on prior exam.       RECOMMENDATIONS:   Clinical correlation. Anesthesia Evaluation  Patient summary reviewed and Nursing notes reviewed no history of anesthetic complications:   Airway: Mallampati: III  TM distance: >3 FB   Neck ROM: full  Mouth opening: > = 3 FB   Dental:          Pulmonary:       (-) not a current smoker                           Cardiovascular:  Exercise tolerance: good (>4 METS),   (+) hypertension:, past MI: > 6 months, CAD:, CABG/stent:,       ECG reviewed  Rhythm: regular  Rate: normal  Echocardiogram reviewed         Beta Blocker:  Dose within 24 Hrs         Neuro/Psych:   (+) seizures:, psychiatric history:depression/anxiety             GI/Hepatic/Renal:   (+) PUD,          ROS comment: H/O gastric bypass    OPEN COMPLETION CHOLECYSTECTOMY WITH ULTRASOUND. Endo/Other:                     Abdominal:             Vascular:   + DVT, . Other Findings:           Anesthesia Plan      general     ASA 3       Induction: inhalational and intravenous. arterial line    Anesthetic plan and risks discussed with patient. Use of blood products discussed with patient whom consented to blood products. Plan discussed with attending and CRNA.                     Luci Hale RN   10/19/2022 normal...

## 2023-12-11 NOTE — PROGRESS NOTE ADULT - PROBLEM/PLAN-4
DISPLAY PLAN FREE TEXT
Heterosexual

## 2024-05-08 NOTE — PROGRESS NOTE ADULT - PROBLEM SELECTOR PROBLEM 6
Dementia
No heavy lifting/straining/Follow Instructions Provided by your Surgical Team

## 2025-04-10 NOTE — ED ADULT NURSE NOTE - TEMPLATE LIST FOR HEAD TO TOE ASSESSMENT
[facility]   GYN ANNUAL PHYSICAL    CHIEF COMPLAINT:    Gyn Exam (Annual )       HISTORY OF PRESENT ILLNESS:    Amber Mcginnis is a pleasant 33 year old       who presents today for her annual well woman exam.    New concerns discussed include: anemia dx  Diagnosed with anemia. Working with hematologist and has experienced good effect from PO iron supplement. Has follow up scheduled.     A review of her menstrual cycle reviews:  Days of menstrual bleedin-6  Days of heavy flow: 2  Cycle length: monthly  Cramping: minimal  Patient's last menstrual period was 2025 (exact date).    She is in a monogamous relationship. They have been together for 14+ years.  She denies any recent history or current physical, emotional, or sexual abuse.  She is using condoms for birth control  She denies history of STIs  She denies consistent dyspareunia  She denies post-coital bleeding    She is satisfied with her physical intimacy level and her libido  Denies urinary symptoms     PROBLEM LIST:    Patient Active Problem List   Diagnosis   • Anemia, unspecified type       HISTORIES:    Allergies, Medications, Medical History, Surgical History, Social History and Family History were reviewed and updated.   Health Maintenance Summary     DTaP/Tdap/Td Vaccine (7 - Td or Tdap)  Overdue since 2/15/2016    Varicella Vaccine (1 of 2 - 13+ 2-dose series)  Never done    Pneumococcal Vaccine 0-49 (1 of 2 - PCV)  Never done    Cervical Cancer Screening (Pap Smear - Every 3 Years)  Order placed this encounter    COVID-19 Vaccine (3 - Pfizer risk series)  Postponed until 2030    Influenza Vaccine (Season Ended)  Next due on 2025    Depression Screening (Yearly)  Next due on 3/17/2026    Meningococcal Vaccine   Completed    Hepatitis B Vaccine   Completed    HPV Vaccine   Completed    Hepatitis A Vaccine   Aged Out    Meningococcal Serogroup B Vaccine   Aged Out         Immunization History   Administered Date(s) Administered   •  COVID Pfizer 12Y+ (Requires Dilution) 11/15/2021, 2021   • DPT 1992, 1992, 1992, 1993   • DTaP 1994, 1996   • HIB (PRP-D) 1992, 1992, 1992, 1993   • HPV Quadrivalent 2009, 2009, 2010   • Hep B, Unspecified Formulation 1997, 1997, 1998   • Hepatitis A - Adult 2012   • Influenza, split virus, trivalent, PF 2009   • MMR 1993, 1996   • Meningococcal Conjugate MCV4P (Menactra) 2009   • Polio, Oral 1992, 1992, 1993, 1994, 1996   • Polio, Unspecified Formulation 1992, 1993   • Tdap 02/15/2006      Past Medical History:   Diagnosis Date   • Allergy    • Anxiety    • Asthma (CMD) 13    mild, per pt   • Migraine    • Pyelonephritis     x 2 or 3; not hospitalized     Past Surgical History:   Procedure Laterality Date   • No past surgeries  13     OB History    Para Term  AB Living   0 0 0 0 0 0   SAB IAB Ectopic Molar Multiple Live Births   0 0 0 0 0 0       Contraception: condoms.     ALLERGIES:   Allergen Reactions   • Ciprofloxacin RASH     Current Outpatient Medications   Medication Sig Dispense Refill   • methimazole (TAPAZOLE) 10 MG tablet Take 2 tablets by mouth daily. 60 tablet 1   • Multiple Vitamins-Minerals (WOMENS MULTI PO) Take 2 tablets by mouth daily. gummie     • atenolol (TENORMIN) 25 MG tablet Take 1 tablet by mouth daily. 90 tablet 1   • ferrous sulfate 325 (65 FE) MG tablet Take 1 tablet by mouth daily (with breakfast). 100 tablet 0     No current facility-administered medications for this visit.       REVIEW OF SYSTEMS:      General:  Denies fevers or chills. Denies abnormal weight loss or gain.  HENT:  Denies headaches. Denies congestion. Denies difficulty with vision or hearing.   Breast:  Denies dominant masses or tenderness. Denies nipple drainage.  Cardiovascular:  Denies chest pains, heart  palpitations.  Respiratory:  Denies shortness of breath, difficulty breathing.  Gastrointestinal:  Denies constipation or diarrhea. Denies digestion issues  Genitourinary:  Denies dysuria or urgency.  Denies dyspareunia, post coital bleeding. Denies abnormal vaginal discharge.   Integument:  Denies any skin changes.  Musculoskeletal:  Denies joint pains or muscle aches.  Psychiatric: Denies mood changes.      OBJECTIVE:     Vitals:    04/10/25 0956   BP: 100/62       Patient's last menstrual period was 03/22/2025 (exact date).    Body mass index is 19.2 kg/m².     PHYSICAL EXAM:    Physical Exam  Constitutional:       General: She is not in acute distress.     Appearance: Normal appearance. She is normal weight. She is not ill-appearing, toxic-appearing or diaphoretic.   Genitourinary:      Vulva and urethral meatus normal.      No lesions in the vagina.      Right Labia: No rash, tenderness, lesions, skin changes or Bartholin's cyst.     Left Labia: No tenderness, lesions, skin changes, Bartholin's cyst or rash.     No vaginal discharge, erythema, tenderness or bleeding.      Cervix is nulliparous.      No cervical lesion, polyp or nabothian cyst.   Breasts:     Breasts are symmetrical.      Right: Normal.      Left: Normal.   HENT:      Head: Normocephalic and atraumatic.   Pulmonary:      Effort: Pulmonary effort is normal.   Neurological:      Mental Status: She is alert and oriented to person, place, and time.   Skin:     General: Skin is warm and dry.   Psychiatric:         Mood and Affect: Mood normal.         Behavior: Behavior normal.         Thought Content: Thought content normal.         Judgment: Judgment normal.   Vitals and nursing note reviewed.          ASSESSMENT:    1. Well woman exam    2. Anemia, unspecified type    3. Screening for malignant neoplasm of cervix        PLAN:    Discussed ASCCP guidelines for pap smear screening frequency.    Pap smear due today. Last: 10/2021: Negative for  intraepithelial lesion or malignancy  Offered STI testing.  STI screening performed: not performed per patient request   Contraception: condoms   HPV Vaccine: completed  Reviewed anemia in terms of dysmenorrhea.  Patient declines hormonal options to regulate menses at this time. Working with hematologist and has experienced good effect from PO iron.  Also provided a list of iron rich foods.  Recommend healthy diet and exercise of at least 150 minutes/week of raising heart rate. Reviewed ACS recommendations for self breast awareness.  RTC in one year for routine well woman exam, or earlier as needed.    All of the patient’s questions and concerns were answered.  She verbalizes understanding and is in agreement with the above mentioned plan of care.  She was encouraged to call should any questions or concerns develop.     General